# Patient Record
Sex: FEMALE | Race: WHITE | HISPANIC OR LATINO | URBAN - METROPOLITAN AREA
[De-identification: names, ages, dates, MRNs, and addresses within clinical notes are randomized per-mention and may not be internally consistent; named-entity substitution may affect disease eponyms.]

---

## 2017-12-17 ENCOUNTER — EMERGENCY (EMERGENCY)
Facility: HOSPITAL | Age: 27
LOS: 0 days | Discharge: HOME | End: 2017-12-17

## 2017-12-17 DIAGNOSIS — R10.12 LEFT UPPER QUADRANT PAIN: ICD-10-CM

## 2017-12-17 DIAGNOSIS — R11.2 NAUSEA WITH VOMITING, UNSPECIFIED: ICD-10-CM

## 2017-12-17 DIAGNOSIS — R51 HEADACHE: ICD-10-CM

## 2017-12-17 DIAGNOSIS — R19.7 DIARRHEA, UNSPECIFIED: ICD-10-CM

## 2017-12-17 DIAGNOSIS — F32.9 MAJOR DEPRESSIVE DISORDER, SINGLE EPISODE, UNSPECIFIED: ICD-10-CM

## 2017-12-22 ENCOUNTER — OUTPATIENT (OUTPATIENT)
Dept: OUTPATIENT SERVICES | Facility: HOSPITAL | Age: 27
LOS: 1 days | Discharge: HOME | End: 2017-12-22

## 2017-12-22 DIAGNOSIS — F33.0 MAJOR DEPRESSIVE DISORDER, RECURRENT, MILD: ICD-10-CM

## 2018-01-31 ENCOUNTER — OUTPATIENT (OUTPATIENT)
Dept: OUTPATIENT SERVICES | Facility: HOSPITAL | Age: 28
LOS: 1 days | Discharge: HOME | End: 2018-01-31

## 2018-01-31 DIAGNOSIS — F33.0 MAJOR DEPRESSIVE DISORDER, RECURRENT, MILD: ICD-10-CM

## 2018-02-28 ENCOUNTER — OUTPATIENT (OUTPATIENT)
Dept: OUTPATIENT SERVICES | Facility: HOSPITAL | Age: 28
LOS: 1 days | Discharge: HOME | End: 2018-02-28

## 2018-02-28 DIAGNOSIS — F33.0 MAJOR DEPRESSIVE DISORDER, RECURRENT, MILD: ICD-10-CM

## 2018-03-16 ENCOUNTER — OUTPATIENT (OUTPATIENT)
Dept: OUTPATIENT SERVICES | Facility: HOSPITAL | Age: 28
LOS: 1 days | Discharge: HOME | End: 2018-03-16

## 2018-03-16 DIAGNOSIS — F41.0 PANIC DISORDER [EPISODIC PAROXYSMAL ANXIETY]: ICD-10-CM

## 2018-03-23 ENCOUNTER — OUTPATIENT (OUTPATIENT)
Dept: OUTPATIENT SERVICES | Facility: HOSPITAL | Age: 28
LOS: 1 days | Discharge: HOME | End: 2018-03-23

## 2018-03-23 DIAGNOSIS — Z00.00 ENCOUNTER FOR GENERAL ADULT MEDICAL EXAMINATION WITHOUT ABNORMAL FINDINGS: ICD-10-CM

## 2018-03-28 ENCOUNTER — OUTPATIENT (OUTPATIENT)
Dept: OUTPATIENT SERVICES | Facility: HOSPITAL | Age: 28
LOS: 1 days | Discharge: HOME | End: 2018-03-28

## 2018-03-28 DIAGNOSIS — F33.0 MAJOR DEPRESSIVE DISORDER, RECURRENT, MILD: ICD-10-CM

## 2018-04-25 ENCOUNTER — OUTPATIENT (OUTPATIENT)
Dept: OUTPATIENT SERVICES | Facility: HOSPITAL | Age: 28
LOS: 1 days | Discharge: HOME | End: 2018-04-25

## 2018-04-25 DIAGNOSIS — F33.0 MAJOR DEPRESSIVE DISORDER, RECURRENT, MILD: ICD-10-CM

## 2018-05-23 ENCOUNTER — OUTPATIENT (OUTPATIENT)
Dept: OUTPATIENT SERVICES | Facility: HOSPITAL | Age: 28
LOS: 1 days | Discharge: HOME | End: 2018-05-23

## 2018-05-23 DIAGNOSIS — F33.0 MAJOR DEPRESSIVE DISORDER, RECURRENT, MILD: ICD-10-CM

## 2018-06-29 ENCOUNTER — OUTPATIENT (OUTPATIENT)
Dept: OUTPATIENT SERVICES | Facility: HOSPITAL | Age: 28
LOS: 1 days | Discharge: HOME | End: 2018-06-29

## 2018-06-29 DIAGNOSIS — F33.0 MAJOR DEPRESSIVE DISORDER, RECURRENT, MILD: ICD-10-CM

## 2018-07-18 ENCOUNTER — OUTPATIENT (OUTPATIENT)
Dept: OUTPATIENT SERVICES | Facility: HOSPITAL | Age: 28
LOS: 1 days | Discharge: HOME | End: 2018-07-18

## 2018-07-18 DIAGNOSIS — F33.0 MAJOR DEPRESSIVE DISORDER, RECURRENT, MILD: ICD-10-CM

## 2018-08-20 ENCOUNTER — TRANSCRIPTION ENCOUNTER (OUTPATIENT)
Age: 28
End: 2018-08-20

## 2018-08-30 ENCOUNTER — OUTPATIENT (OUTPATIENT)
Dept: OUTPATIENT SERVICES | Facility: HOSPITAL | Age: 28
LOS: 1 days | Discharge: HOME | End: 2018-08-30

## 2018-08-30 DIAGNOSIS — F33.0 MAJOR DEPRESSIVE DISORDER, RECURRENT, MILD: ICD-10-CM

## 2018-09-19 ENCOUNTER — OUTPATIENT (OUTPATIENT)
Dept: OUTPATIENT SERVICES | Facility: HOSPITAL | Age: 28
LOS: 1 days | Discharge: HOME | End: 2018-09-19

## 2018-09-19 DIAGNOSIS — R23.2 FLUSHING: ICD-10-CM

## 2018-09-19 DIAGNOSIS — F41.0 PANIC DISORDER [EPISODIC PAROXYSMAL ANXIETY]: ICD-10-CM

## 2018-10-17 ENCOUNTER — OUTPATIENT (OUTPATIENT)
Dept: OUTPATIENT SERVICES | Facility: HOSPITAL | Age: 28
LOS: 1 days | Discharge: HOME | End: 2018-10-17

## 2018-10-17 DIAGNOSIS — R23.2 FLUSHING: ICD-10-CM

## 2018-10-17 DIAGNOSIS — F41.0 PANIC DISORDER [EPISODIC PAROXYSMAL ANXIETY]: ICD-10-CM

## 2018-11-28 ENCOUNTER — OUTPATIENT (OUTPATIENT)
Dept: OUTPATIENT SERVICES | Facility: HOSPITAL | Age: 28
LOS: 1 days | Discharge: HOME | End: 2018-11-28

## 2018-11-28 DIAGNOSIS — F41.0 PANIC DISORDER [EPISODIC PAROXYSMAL ANXIETY]: ICD-10-CM

## 2018-11-28 DIAGNOSIS — R23.2 FLUSHING: ICD-10-CM

## 2018-12-12 ENCOUNTER — OUTPATIENT (OUTPATIENT)
Dept: OUTPATIENT SERVICES | Facility: HOSPITAL | Age: 28
LOS: 1 days | Discharge: HOME | End: 2018-12-12

## 2018-12-12 DIAGNOSIS — F41.0 PANIC DISORDER [EPISODIC PAROXYSMAL ANXIETY]: ICD-10-CM

## 2018-12-12 DIAGNOSIS — R23.2 FLUSHING: ICD-10-CM

## 2019-01-09 ENCOUNTER — OUTPATIENT (OUTPATIENT)
Dept: OUTPATIENT SERVICES | Facility: HOSPITAL | Age: 29
LOS: 1 days | Discharge: HOME | End: 2019-01-09

## 2019-01-09 DIAGNOSIS — F41.0 PANIC DISORDER [EPISODIC PAROXYSMAL ANXIETY]: ICD-10-CM

## 2019-01-09 DIAGNOSIS — R23.2 FLUSHING: ICD-10-CM

## 2019-02-06 ENCOUNTER — OUTPATIENT (OUTPATIENT)
Dept: OUTPATIENT SERVICES | Facility: HOSPITAL | Age: 29
LOS: 1 days | Discharge: HOME | End: 2019-02-06

## 2019-02-06 DIAGNOSIS — F41.0 PANIC DISORDER [EPISODIC PAROXYSMAL ANXIETY]: ICD-10-CM

## 2019-02-27 ENCOUNTER — OUTPATIENT (OUTPATIENT)
Dept: OUTPATIENT SERVICES | Facility: HOSPITAL | Age: 29
LOS: 1 days | Discharge: HOME | End: 2019-02-27

## 2019-02-27 DIAGNOSIS — F41.0 PANIC DISORDER [EPISODIC PAROXYSMAL ANXIETY]: ICD-10-CM

## 2019-05-08 ENCOUNTER — OUTPATIENT (OUTPATIENT)
Dept: OUTPATIENT SERVICES | Facility: HOSPITAL | Age: 29
LOS: 1 days | Discharge: HOME | End: 2019-05-08

## 2019-05-09 DIAGNOSIS — F41.0 PANIC DISORDER [EPISODIC PAROXYSMAL ANXIETY]: ICD-10-CM

## 2019-08-28 ENCOUNTER — OUTPATIENT (OUTPATIENT)
Dept: OUTPATIENT SERVICES | Facility: HOSPITAL | Age: 29
LOS: 1 days | Discharge: HOME | End: 2019-08-28
Payer: COMMERCIAL

## 2019-08-28 DIAGNOSIS — F41.0 PANIC DISORDER [EPISODIC PAROXYSMAL ANXIETY]: ICD-10-CM

## 2019-08-28 PROCEDURE — 99213 OFFICE O/P EST LOW 20 MIN: CPT | Mod: GC

## 2019-10-03 ENCOUNTER — OUTPATIENT (OUTPATIENT)
Dept: OUTPATIENT SERVICES | Facility: HOSPITAL | Age: 29
LOS: 1 days | Discharge: HOME | End: 2019-10-03

## 2019-10-03 DIAGNOSIS — F41.0 PANIC DISORDER [EPISODIC PAROXYSMAL ANXIETY]: ICD-10-CM

## 2019-10-17 ENCOUNTER — OUTPATIENT (OUTPATIENT)
Dept: OUTPATIENT SERVICES | Facility: HOSPITAL | Age: 29
LOS: 1 days | Discharge: HOME | End: 2019-10-17

## 2019-10-17 DIAGNOSIS — F41.0 PANIC DISORDER [EPISODIC PAROXYSMAL ANXIETY]: ICD-10-CM

## 2019-11-25 ENCOUNTER — OUTPATIENT (OUTPATIENT)
Dept: OUTPATIENT SERVICES | Facility: HOSPITAL | Age: 29
LOS: 1 days | Discharge: HOME | End: 2019-11-25

## 2019-11-25 DIAGNOSIS — F41.0 PANIC DISORDER [EPISODIC PAROXYSMAL ANXIETY]: ICD-10-CM

## 2019-12-09 ENCOUNTER — OUTPATIENT (OUTPATIENT)
Dept: OUTPATIENT SERVICES | Facility: HOSPITAL | Age: 29
LOS: 1 days | Discharge: HOME | End: 2019-12-09

## 2019-12-09 DIAGNOSIS — F41.0 PANIC DISORDER [EPISODIC PAROXYSMAL ANXIETY]: ICD-10-CM

## 2019-12-19 ENCOUNTER — OUTPATIENT (OUTPATIENT)
Dept: OUTPATIENT SERVICES | Facility: HOSPITAL | Age: 29
LOS: 1 days | Discharge: HOME | End: 2019-12-19
Payer: COMMERCIAL

## 2019-12-19 DIAGNOSIS — F41.0 PANIC DISORDER [EPISODIC PAROXYSMAL ANXIETY]: ICD-10-CM

## 2019-12-19 PROCEDURE — 99214 OFFICE O/P EST MOD 30 MIN: CPT

## 2020-01-30 ENCOUNTER — OUTPATIENT (OUTPATIENT)
Dept: OUTPATIENT SERVICES | Facility: HOSPITAL | Age: 30
LOS: 1 days | Discharge: HOME | End: 2020-01-30

## 2020-01-30 DIAGNOSIS — F41.0 PANIC DISORDER [EPISODIC PAROXYSMAL ANXIETY]: ICD-10-CM

## 2020-02-06 ENCOUNTER — OUTPATIENT (OUTPATIENT)
Dept: OUTPATIENT SERVICES | Facility: HOSPITAL | Age: 30
LOS: 1 days | Discharge: HOME | End: 2020-02-06

## 2020-02-06 DIAGNOSIS — F41.0 PANIC DISORDER [EPISODIC PAROXYSMAL ANXIETY]: ICD-10-CM

## 2020-02-14 ENCOUNTER — OUTPATIENT (OUTPATIENT)
Dept: OUTPATIENT SERVICES | Facility: HOSPITAL | Age: 30
LOS: 1 days | Discharge: HOME | End: 2020-02-14

## 2020-02-14 DIAGNOSIS — F41.0 PANIC DISORDER [EPISODIC PAROXYSMAL ANXIETY]: ICD-10-CM

## 2020-03-18 ENCOUNTER — OUTPATIENT (OUTPATIENT)
Dept: OUTPATIENT SERVICES | Facility: HOSPITAL | Age: 30
LOS: 1 days | Discharge: HOME | End: 2020-03-18

## 2020-03-18 DIAGNOSIS — F41.0 PANIC DISORDER [EPISODIC PAROXYSMAL ANXIETY]: ICD-10-CM

## 2020-03-30 ENCOUNTER — OUTPATIENT (OUTPATIENT)
Dept: OUTPATIENT SERVICES | Facility: HOSPITAL | Age: 30
LOS: 1 days | Discharge: HOME | End: 2020-03-30

## 2020-03-30 DIAGNOSIS — F41.0 PANIC DISORDER [EPISODIC PAROXYSMAL ANXIETY]: ICD-10-CM

## 2020-04-14 ENCOUNTER — OUTPATIENT (OUTPATIENT)
Dept: OUTPATIENT SERVICES | Facility: HOSPITAL | Age: 30
LOS: 1 days | Discharge: HOME | End: 2020-04-14

## 2020-04-14 DIAGNOSIS — F41.0 PANIC DISORDER [EPISODIC PAROXYSMAL ANXIETY]: ICD-10-CM

## 2020-05-14 ENCOUNTER — OUTPATIENT (OUTPATIENT)
Dept: OUTPATIENT SERVICES | Facility: HOSPITAL | Age: 30
LOS: 1 days | Discharge: HOME | End: 2020-05-14
Payer: COMMERCIAL

## 2020-05-14 DIAGNOSIS — F41.0 PANIC DISORDER [EPISODIC PAROXYSMAL ANXIETY]: ICD-10-CM

## 2020-05-14 PROCEDURE — 99213 OFFICE O/P EST LOW 20 MIN: CPT | Mod: 95,GC

## 2020-05-28 ENCOUNTER — OUTPATIENT (OUTPATIENT)
Dept: OUTPATIENT SERVICES | Facility: HOSPITAL | Age: 30
LOS: 1 days | Discharge: HOME | End: 2020-05-28

## 2020-05-28 DIAGNOSIS — F41.0 PANIC DISORDER [EPISODIC PAROXYSMAL ANXIETY]: ICD-10-CM

## 2020-06-11 ENCOUNTER — OUTPATIENT (OUTPATIENT)
Dept: OUTPATIENT SERVICES | Facility: HOSPITAL | Age: 30
LOS: 1 days | Discharge: HOME | End: 2020-06-11

## 2020-06-11 DIAGNOSIS — F41.0 PANIC DISORDER [EPISODIC PAROXYSMAL ANXIETY]: ICD-10-CM

## 2020-06-18 ENCOUNTER — OUTPATIENT (OUTPATIENT)
Dept: OUTPATIENT SERVICES | Facility: HOSPITAL | Age: 30
LOS: 1 days | Discharge: HOME | End: 2020-06-18
Payer: COMMERCIAL

## 2020-06-18 DIAGNOSIS — F41.0 PANIC DISORDER [EPISODIC PAROXYSMAL ANXIETY]: ICD-10-CM

## 2020-06-18 PROCEDURE — 99214 OFFICE O/P EST MOD 30 MIN: CPT | Mod: GC,95

## 2020-06-25 ENCOUNTER — OUTPATIENT (OUTPATIENT)
Dept: OUTPATIENT SERVICES | Facility: HOSPITAL | Age: 30
LOS: 1 days | Discharge: HOME | End: 2020-06-25

## 2020-06-25 DIAGNOSIS — F41.0 PANIC DISORDER [EPISODIC PAROXYSMAL ANXIETY]: ICD-10-CM

## 2020-07-09 ENCOUNTER — OUTPATIENT (OUTPATIENT)
Dept: OUTPATIENT SERVICES | Facility: HOSPITAL | Age: 30
LOS: 1 days | Discharge: HOME | End: 2020-07-09

## 2020-07-09 DIAGNOSIS — F41.0 PANIC DISORDER [EPISODIC PAROXYSMAL ANXIETY]: ICD-10-CM

## 2020-07-23 ENCOUNTER — OUTPATIENT (OUTPATIENT)
Dept: OUTPATIENT SERVICES | Facility: HOSPITAL | Age: 30
LOS: 1 days | Discharge: HOME | End: 2020-07-23
Payer: COMMERCIAL

## 2020-07-23 DIAGNOSIS — F41.0 PANIC DISORDER [EPISODIC PAROXYSMAL ANXIETY]: ICD-10-CM

## 2020-07-23 PROCEDURE — 99214 OFFICE O/P EST MOD 30 MIN: CPT

## 2020-08-28 ENCOUNTER — OUTPATIENT (OUTPATIENT)
Dept: OUTPATIENT SERVICES | Facility: HOSPITAL | Age: 30
LOS: 1 days | Discharge: HOME | End: 2020-08-28

## 2020-08-28 DIAGNOSIS — F41.0 PANIC DISORDER [EPISODIC PAROXYSMAL ANXIETY]: ICD-10-CM

## 2020-09-03 ENCOUNTER — OUTPATIENT (OUTPATIENT)
Dept: OUTPATIENT SERVICES | Facility: HOSPITAL | Age: 30
LOS: 1 days | Discharge: HOME | End: 2020-09-03
Payer: MEDICARE

## 2020-09-03 DIAGNOSIS — F41.0 PANIC DISORDER [EPISODIC PAROXYSMAL ANXIETY]: ICD-10-CM

## 2020-09-03 DIAGNOSIS — F41.1 GENERALIZED ANXIETY DISORDER: ICD-10-CM

## 2020-09-03 PROCEDURE — 99214 OFFICE O/P EST MOD 30 MIN: CPT | Mod: 95,GC

## 2020-09-18 ENCOUNTER — OUTPATIENT (OUTPATIENT)
Dept: OUTPATIENT SERVICES | Facility: HOSPITAL | Age: 30
LOS: 1 days | Discharge: HOME | End: 2020-09-18

## 2020-09-18 DIAGNOSIS — F41.0 PANIC DISORDER [EPISODIC PAROXYSMAL ANXIETY]: ICD-10-CM

## 2020-09-18 DIAGNOSIS — F41.1 GENERALIZED ANXIETY DISORDER: ICD-10-CM

## 2020-12-31 ENCOUNTER — OUTPATIENT (OUTPATIENT)
Dept: OUTPATIENT SERVICES | Facility: HOSPITAL | Age: 30
LOS: 1 days | Discharge: HOME | End: 2020-12-31

## 2020-12-31 ENCOUNTER — APPOINTMENT (OUTPATIENT)
Dept: PSYCHIATRY | Facility: CLINIC | Age: 30
End: 2020-12-31

## 2020-12-31 DIAGNOSIS — F41.0 PANIC DISORDER [EPISODIC PAROXYSMAL ANXIETY]: ICD-10-CM

## 2020-12-31 DIAGNOSIS — F41.1 GENERALIZED ANXIETY DISORDER: ICD-10-CM

## 2021-01-07 ENCOUNTER — APPOINTMENT (OUTPATIENT)
Dept: PSYCHIATRY | Facility: CLINIC | Age: 31
End: 2021-01-07

## 2021-01-07 ENCOUNTER — OUTPATIENT (OUTPATIENT)
Dept: OUTPATIENT SERVICES | Facility: HOSPITAL | Age: 31
LOS: 1 days | Discharge: HOME | End: 2021-01-07

## 2021-01-07 DIAGNOSIS — N31.2 FLACCID NEUROPATHIC BLADDER, NOT ELSEWHERE CLASSIFIED: ICD-10-CM

## 2021-01-07 DIAGNOSIS — K21.9 GASTRO-ESOPHAGEAL REFLUX DISEASE W/OUT ESOPHAGITIS: ICD-10-CM

## 2021-01-07 DIAGNOSIS — F41.0 PANIC DISORDER [EPISODIC PAROXYSMAL ANXIETY]: ICD-10-CM

## 2021-01-07 DIAGNOSIS — F41.1 GENERALIZED ANXIETY DISORDER: ICD-10-CM

## 2021-01-21 ENCOUNTER — OUTPATIENT (OUTPATIENT)
Dept: OUTPATIENT SERVICES | Facility: HOSPITAL | Age: 31
LOS: 1 days | Discharge: HOME | End: 2021-01-21

## 2021-01-21 ENCOUNTER — APPOINTMENT (OUTPATIENT)
Dept: PSYCHIATRY | Facility: CLINIC | Age: 31
End: 2021-01-21

## 2021-01-21 DIAGNOSIS — F41.0 PANIC DISORDER [EPISODIC PAROXYSMAL ANXIETY]: ICD-10-CM

## 2021-01-21 DIAGNOSIS — F41.1 GENERALIZED ANXIETY DISORDER: ICD-10-CM

## 2021-02-04 ENCOUNTER — APPOINTMENT (OUTPATIENT)
Dept: PSYCHIATRY | Facility: CLINIC | Age: 31
End: 2021-02-04

## 2021-02-11 ENCOUNTER — APPOINTMENT (OUTPATIENT)
Dept: PSYCHIATRY | Facility: CLINIC | Age: 31
End: 2021-02-11

## 2021-02-11 ENCOUNTER — OUTPATIENT (OUTPATIENT)
Dept: OUTPATIENT SERVICES | Facility: HOSPITAL | Age: 31
LOS: 1 days | Discharge: HOME | End: 2021-02-11

## 2021-02-11 DIAGNOSIS — F41.1 GENERALIZED ANXIETY DISORDER: ICD-10-CM

## 2021-02-11 DIAGNOSIS — F41.0 PANIC DISORDER [EPISODIC PAROXYSMAL ANXIETY]: ICD-10-CM

## 2021-02-25 ENCOUNTER — APPOINTMENT (OUTPATIENT)
Dept: PSYCHIATRY | Facility: CLINIC | Age: 31
End: 2021-02-25

## 2021-03-04 ENCOUNTER — APPOINTMENT (OUTPATIENT)
Dept: PSYCHIATRY | Facility: CLINIC | Age: 31
End: 2021-03-04

## 2021-03-12 ENCOUNTER — APPOINTMENT (OUTPATIENT)
Dept: PSYCHIATRY | Facility: CLINIC | Age: 31
End: 2021-03-12

## 2021-03-12 ENCOUNTER — OUTPATIENT (OUTPATIENT)
Dept: OUTPATIENT SERVICES | Facility: HOSPITAL | Age: 31
LOS: 1 days | Discharge: HOME | End: 2021-03-12

## 2021-03-12 DIAGNOSIS — F41.1 GENERALIZED ANXIETY DISORDER: ICD-10-CM

## 2021-03-12 DIAGNOSIS — F41.0 PANIC DISORDER [EPISODIC PAROXYSMAL ANXIETY]: ICD-10-CM

## 2021-03-25 ENCOUNTER — OUTPATIENT (OUTPATIENT)
Dept: OUTPATIENT SERVICES | Facility: HOSPITAL | Age: 31
LOS: 1 days | Discharge: HOME | End: 2021-03-25

## 2021-03-25 ENCOUNTER — APPOINTMENT (OUTPATIENT)
Dept: PSYCHIATRY | Facility: CLINIC | Age: 31
End: 2021-03-25

## 2021-03-25 DIAGNOSIS — F41.1 GENERALIZED ANXIETY DISORDER: ICD-10-CM

## 2021-03-25 DIAGNOSIS — F41.0 PANIC DISORDER [EPISODIC PAROXYSMAL ANXIETY]: ICD-10-CM

## 2021-04-15 ENCOUNTER — APPOINTMENT (OUTPATIENT)
Dept: PSYCHIATRY | Facility: CLINIC | Age: 31
End: 2021-04-15

## 2021-04-22 ENCOUNTER — APPOINTMENT (OUTPATIENT)
Dept: PSYCHIATRY | Facility: CLINIC | Age: 31
End: 2021-04-22

## 2021-04-29 ENCOUNTER — OUTPATIENT (OUTPATIENT)
Dept: OUTPATIENT SERVICES | Facility: HOSPITAL | Age: 31
LOS: 1 days | Discharge: HOME | End: 2021-04-29

## 2021-04-29 ENCOUNTER — APPOINTMENT (OUTPATIENT)
Dept: PSYCHIATRY | Facility: CLINIC | Age: 31
End: 2021-04-29

## 2021-04-29 DIAGNOSIS — F41.1 GENERALIZED ANXIETY DISORDER: ICD-10-CM

## 2021-04-29 DIAGNOSIS — F41.0 PANIC DISORDER [EPISODIC PAROXYSMAL ANXIETY]: ICD-10-CM

## 2021-05-27 ENCOUNTER — APPOINTMENT (OUTPATIENT)
Dept: PSYCHIATRY | Facility: CLINIC | Age: 31
End: 2021-05-27

## 2021-06-24 ENCOUNTER — APPOINTMENT (OUTPATIENT)
Dept: PSYCHIATRY | Facility: CLINIC | Age: 31
End: 2021-06-24

## 2021-07-08 ENCOUNTER — OUTPATIENT (OUTPATIENT)
Dept: OUTPATIENT SERVICES | Facility: HOSPITAL | Age: 31
LOS: 1 days | Discharge: HOME | End: 2021-07-08

## 2021-07-08 ENCOUNTER — APPOINTMENT (OUTPATIENT)
Dept: PSYCHIATRY | Facility: CLINIC | Age: 31
End: 2021-07-08

## 2021-07-08 DIAGNOSIS — F41.0 PANIC DISORDER [EPISODIC PAROXYSMAL ANXIETY]: ICD-10-CM

## 2021-07-08 DIAGNOSIS — F41.1 GENERALIZED ANXIETY DISORDER: ICD-10-CM

## 2021-07-23 ENCOUNTER — OUTPATIENT (OUTPATIENT)
Dept: OUTPATIENT SERVICES | Facility: HOSPITAL | Age: 31
LOS: 1 days | Discharge: HOME | End: 2021-07-23

## 2021-07-23 ENCOUNTER — APPOINTMENT (OUTPATIENT)
Dept: PSYCHIATRY | Facility: CLINIC | Age: 31
End: 2021-07-23

## 2021-07-23 DIAGNOSIS — F52.9 UNSPECIFIED SEXUAL DYSFUNCTION NOT DUE TO A SUBSTANCE OR KNOWN PHYSIOLOGICAL CONDITION: ICD-10-CM

## 2021-07-23 DIAGNOSIS — F41.1 GENERALIZED ANXIETY DISORDER: ICD-10-CM

## 2021-07-23 DIAGNOSIS — F41.0 PANIC DISORDER [EPISODIC PAROXYSMAL ANXIETY]: ICD-10-CM

## 2021-07-23 RX ORDER — BUPROPION HYDROCHLORIDE 450 MG/1
450 TABLET, FILM COATED, EXTENDED RELEASE ORAL EVERY MORNING
Qty: 21 | Refills: 0 | Status: DISCONTINUED | COMMUNITY
Start: 2021-01-07 | End: 2021-07-23

## 2021-07-23 RX ORDER — TRAZODONE HYDROCHLORIDE 50 MG/1
50 TABLET ORAL
Qty: 60 | Refills: 0 | Status: DISCONTINUED | COMMUNITY
Start: 2021-01-07 | End: 2021-07-23

## 2021-07-23 RX ORDER — PROPRANOLOL HYDROCHLORIDE 10 MG/1
10 TABLET ORAL
Qty: 60 | Refills: 0 | Status: DISCONTINUED | COMMUNITY
Start: 2021-01-07 | End: 2021-07-23

## 2021-07-29 ENCOUNTER — OUTPATIENT (OUTPATIENT)
Dept: OUTPATIENT SERVICES | Facility: HOSPITAL | Age: 31
LOS: 1 days | Discharge: HOME | End: 2021-07-29

## 2021-07-29 ENCOUNTER — APPOINTMENT (OUTPATIENT)
Dept: PSYCHIATRY | Facility: CLINIC | Age: 31
End: 2021-07-29

## 2021-07-29 DIAGNOSIS — F41.0 PANIC DISORDER [EPISODIC PAROXYSMAL ANXIETY]: ICD-10-CM

## 2021-07-29 DIAGNOSIS — F52.9 UNSPECIFIED SEXUAL DYSFUNCTION NOT DUE TO A SUBSTANCE OR KNOWN PHYSIOLOGICAL CONDITION: ICD-10-CM

## 2021-07-29 DIAGNOSIS — F41.1 GENERALIZED ANXIETY DISORDER: ICD-10-CM

## 2021-08-13 ENCOUNTER — OUTPATIENT (OUTPATIENT)
Dept: OUTPATIENT SERVICES | Facility: HOSPITAL | Age: 31
LOS: 1 days | Discharge: HOME | End: 2021-08-13

## 2021-08-13 ENCOUNTER — APPOINTMENT (OUTPATIENT)
Dept: PSYCHIATRY | Facility: CLINIC | Age: 31
End: 2021-08-13

## 2021-08-13 DIAGNOSIS — F41.0 PANIC DISORDER [EPISODIC PAROXYSMAL ANXIETY]: ICD-10-CM

## 2021-08-13 DIAGNOSIS — F52.9 UNSPECIFIED SEXUAL DYSFUNCTION NOT DUE TO A SUBSTANCE OR KNOWN PHYSIOLOGICAL CONDITION: ICD-10-CM

## 2021-08-13 DIAGNOSIS — F41.1 GENERALIZED ANXIETY DISORDER: ICD-10-CM

## 2021-08-13 RX ORDER — BUPROPION HYDROCHLORIDE 450 MG/1
450 TABLET, FILM COATED, EXTENDED RELEASE ORAL
Qty: 21 | Refills: 0 | Status: DISCONTINUED | COMMUNITY
Start: 2021-07-23 | End: 2021-08-13

## 2021-08-17 ENCOUNTER — APPOINTMENT (OUTPATIENT)
Dept: PSYCHIATRY | Facility: CLINIC | Age: 31
End: 2021-08-17

## 2021-08-17 ENCOUNTER — OUTPATIENT (OUTPATIENT)
Dept: OUTPATIENT SERVICES | Facility: HOSPITAL | Age: 31
LOS: 1 days | Discharge: HOME | End: 2021-08-17

## 2021-08-17 DIAGNOSIS — F52.9 UNSPECIFIED SEXUAL DYSFUNCTION NOT DUE TO A SUBSTANCE OR KNOWN PHYSIOLOGICAL CONDITION: ICD-10-CM

## 2021-08-17 DIAGNOSIS — F41.0 PANIC DISORDER [EPISODIC PAROXYSMAL ANXIETY]: ICD-10-CM

## 2021-08-17 DIAGNOSIS — F41.1 GENERALIZED ANXIETY DISORDER: ICD-10-CM

## 2021-09-07 ENCOUNTER — NON-APPOINTMENT (OUTPATIENT)
Age: 31
End: 2021-09-07

## 2021-09-15 ENCOUNTER — OUTPATIENT (OUTPATIENT)
Dept: OUTPATIENT SERVICES | Facility: HOSPITAL | Age: 31
LOS: 1 days | Discharge: HOME | End: 2021-09-15

## 2021-09-15 ENCOUNTER — APPOINTMENT (OUTPATIENT)
Dept: PSYCHIATRY | Facility: CLINIC | Age: 31
End: 2021-09-15

## 2021-09-15 DIAGNOSIS — F52.9 UNSPECIFIED SEXUAL DYSFUNCTION NOT DUE TO A SUBSTANCE OR KNOWN PHYSIOLOGICAL CONDITION: ICD-10-CM

## 2021-09-15 DIAGNOSIS — F41.0 PANIC DISORDER [EPISODIC PAROXYSMAL ANXIETY]: ICD-10-CM

## 2021-09-15 DIAGNOSIS — F41.1 GENERALIZED ANXIETY DISORDER: ICD-10-CM

## 2021-09-17 ENCOUNTER — APPOINTMENT (OUTPATIENT)
Dept: PSYCHIATRY | Facility: CLINIC | Age: 31
End: 2021-09-17

## 2021-09-27 ENCOUNTER — APPOINTMENT (OUTPATIENT)
Dept: PSYCHIATRY | Facility: CLINIC | Age: 31
End: 2021-09-27

## 2021-09-27 ENCOUNTER — OUTPATIENT (OUTPATIENT)
Dept: OUTPATIENT SERVICES | Facility: HOSPITAL | Age: 31
LOS: 1 days | Discharge: HOME | End: 2021-09-27

## 2021-09-27 DIAGNOSIS — F41.0 PANIC DISORDER [EPISODIC PAROXYSMAL ANXIETY]: ICD-10-CM

## 2021-09-27 DIAGNOSIS — F41.1 GENERALIZED ANXIETY DISORDER: ICD-10-CM

## 2021-09-27 DIAGNOSIS — F41.8 OTHER SPECIFIED ANXIETY DISORDERS: ICD-10-CM

## 2021-10-07 ENCOUNTER — APPOINTMENT (OUTPATIENT)
Dept: PSYCHIATRY | Facility: CLINIC | Age: 31
End: 2021-10-07

## 2021-11-05 ENCOUNTER — APPOINTMENT (OUTPATIENT)
Dept: PSYCHIATRY | Facility: CLINIC | Age: 31
End: 2021-11-05

## 2021-11-05 ENCOUNTER — OUTPATIENT (OUTPATIENT)
Dept: OUTPATIENT SERVICES | Facility: HOSPITAL | Age: 31
LOS: 1 days | Discharge: HOME | End: 2021-11-05

## 2021-11-05 DIAGNOSIS — F41.1 GENERALIZED ANXIETY DISORDER: ICD-10-CM

## 2021-11-05 DIAGNOSIS — F41.8 OTHER SPECIFIED ANXIETY DISORDERS: ICD-10-CM

## 2021-11-05 DIAGNOSIS — F41.0 PANIC DISORDER [EPISODIC PAROXYSMAL ANXIETY]: ICD-10-CM

## 2021-12-27 ENCOUNTER — APPOINTMENT (OUTPATIENT)
Dept: PSYCHIATRY | Facility: CLINIC | Age: 31
End: 2021-12-27

## 2022-01-10 ENCOUNTER — OUTPATIENT (OUTPATIENT)
Dept: OUTPATIENT SERVICES | Facility: HOSPITAL | Age: 32
LOS: 1 days | Discharge: HOME | End: 2022-01-10

## 2022-01-10 ENCOUNTER — APPOINTMENT (OUTPATIENT)
Dept: PSYCHIATRY | Facility: CLINIC | Age: 32
End: 2022-01-10

## 2022-01-10 DIAGNOSIS — F41.8 OTHER SPECIFIED ANXIETY DISORDERS: ICD-10-CM

## 2022-01-10 DIAGNOSIS — F41.0 PANIC DISORDER [EPISODIC PAROXYSMAL ANXIETY]: ICD-10-CM

## 2022-01-10 DIAGNOSIS — F41.1 GENERALIZED ANXIETY DISORDER: ICD-10-CM

## 2022-01-10 DIAGNOSIS — G47.00 INSOMNIA, UNSPECIFIED: ICD-10-CM

## 2022-02-20 RX ORDER — FLUOXETINE HYDROCHLORIDE 40 MG/1
40 CAPSULE ORAL DAILY
Qty: 60 | Refills: 0 | Status: DISCONTINUED | COMMUNITY
Start: 2021-09-27 | End: 2022-02-20

## 2022-02-28 ENCOUNTER — APPOINTMENT (OUTPATIENT)
Dept: PSYCHIATRY | Facility: CLINIC | Age: 32
End: 2022-02-28

## 2022-03-14 ENCOUNTER — APPOINTMENT (OUTPATIENT)
Dept: PSYCHIATRY | Facility: CLINIC | Age: 32
End: 2022-03-14

## 2022-03-21 ENCOUNTER — APPOINTMENT (OUTPATIENT)
Dept: PSYCHIATRY | Facility: CLINIC | Age: 32
End: 2022-03-21

## 2022-03-21 ENCOUNTER — OUTPATIENT (OUTPATIENT)
Dept: OUTPATIENT SERVICES | Facility: HOSPITAL | Age: 32
LOS: 1 days | Discharge: HOME | End: 2022-03-21

## 2022-03-21 DIAGNOSIS — G47.00 INSOMNIA, UNSPECIFIED: ICD-10-CM

## 2022-03-21 DIAGNOSIS — F41.0 PANIC DISORDER [EPISODIC PAROXYSMAL ANXIETY]: ICD-10-CM

## 2022-03-21 DIAGNOSIS — F41.8 OTHER SPECIFIED ANXIETY DISORDERS: ICD-10-CM

## 2022-03-21 DIAGNOSIS — F41.1 GENERALIZED ANXIETY DISORDER: ICD-10-CM

## 2022-03-21 DIAGNOSIS — F52.9 UNSPECIFIED SEXUAL DYSFUNCTION NOT DUE TO A SUBSTANCE OR KNOWN PHYSIOLOGICAL CONDITION: ICD-10-CM

## 2022-05-11 ENCOUNTER — NON-APPOINTMENT (OUTPATIENT)
Age: 32
End: 2022-05-11

## 2022-05-16 ENCOUNTER — APPOINTMENT (OUTPATIENT)
Dept: PSYCHIATRY | Facility: CLINIC | Age: 32
End: 2022-05-16

## 2022-06-01 ENCOUNTER — NON-APPOINTMENT (OUTPATIENT)
Age: 32
End: 2022-06-01

## 2022-06-13 ENCOUNTER — APPOINTMENT (OUTPATIENT)
Dept: PSYCHIATRY | Facility: CLINIC | Age: 32
End: 2022-06-13

## 2022-06-13 ENCOUNTER — OUTPATIENT (OUTPATIENT)
Dept: OUTPATIENT SERVICES | Facility: HOSPITAL | Age: 32
LOS: 1 days | Discharge: HOME | End: 2022-06-13

## 2022-06-13 DIAGNOSIS — F19.981: ICD-10-CM

## 2022-06-13 DIAGNOSIS — F41.8 OTHER SPECIFIED ANXIETY DISORDERS: ICD-10-CM

## 2022-06-13 DIAGNOSIS — F41.1 GENERALIZED ANXIETY DISORDER: ICD-10-CM

## 2022-06-13 DIAGNOSIS — Z87.898 PERSONAL HISTORY OF OTHER SPECIFIED CONDITIONS: ICD-10-CM

## 2022-06-13 DIAGNOSIS — F41.0 PANIC DISORDER [EPISODIC PAROXYSMAL ANXIETY]: ICD-10-CM

## 2022-06-13 DIAGNOSIS — G47.00 INSOMNIA, UNSPECIFIED: ICD-10-CM

## 2022-06-13 DIAGNOSIS — F52.9 UNSPECIFIED SEXUAL DYSFUNCTION NOT DUE TO A SUBSTANCE OR KNOWN PHYSIOLOGICAL CONDITION: ICD-10-CM

## 2022-06-14 ENCOUNTER — APPOINTMENT (OUTPATIENT)
Dept: PSYCHIATRY | Facility: CLINIC | Age: 32
End: 2022-06-14

## 2022-06-30 PROBLEM — Z87.898 HISTORY OF SEXUAL PROBLEM: Status: RESOLVED | Noted: 2021-01-07 | Resolved: 2022-06-13

## 2022-08-01 ENCOUNTER — APPOINTMENT (OUTPATIENT)
Dept: PSYCHIATRY | Facility: CLINIC | Age: 32
End: 2022-08-01

## 2022-08-01 ENCOUNTER — OUTPATIENT (OUTPATIENT)
Dept: OUTPATIENT SERVICES | Facility: HOSPITAL | Age: 32
LOS: 1 days | Discharge: HOME | End: 2022-08-01

## 2022-08-01 DIAGNOSIS — F19.981: ICD-10-CM

## 2022-08-01 DIAGNOSIS — F41.8 OTHER SPECIFIED ANXIETY DISORDERS: ICD-10-CM

## 2022-08-01 DIAGNOSIS — G47.00 INSOMNIA, UNSPECIFIED: ICD-10-CM

## 2022-08-01 DIAGNOSIS — F41.0 PANIC DISORDER [EPISODIC PAROXYSMAL ANXIETY]: ICD-10-CM

## 2022-08-01 DIAGNOSIS — F41.1 GENERALIZED ANXIETY DISORDER: ICD-10-CM

## 2022-09-29 ENCOUNTER — APPOINTMENT (OUTPATIENT)
Dept: PSYCHIATRY | Facility: CLINIC | Age: 32
End: 2022-09-29

## 2022-09-29 ENCOUNTER — OUTPATIENT (OUTPATIENT)
Dept: OUTPATIENT SERVICES | Facility: HOSPITAL | Age: 32
LOS: 1 days | Discharge: HOME | End: 2022-09-29

## 2022-09-29 DIAGNOSIS — F41.8 OTHER SPECIFIED ANXIETY DISORDERS: ICD-10-CM

## 2022-09-29 DIAGNOSIS — F41.0 PANIC DISORDER [EPISODIC PAROXYSMAL ANXIETY]: ICD-10-CM

## 2022-09-29 DIAGNOSIS — F41.1 GENERALIZED ANXIETY DISORDER: ICD-10-CM

## 2022-11-17 ENCOUNTER — APPOINTMENT (OUTPATIENT)
Dept: PSYCHIATRY | Facility: CLINIC | Age: 32
End: 2022-11-17

## 2022-11-17 ENCOUNTER — OUTPATIENT (OUTPATIENT)
Dept: OUTPATIENT SERVICES | Facility: HOSPITAL | Age: 32
LOS: 1 days | Discharge: HOME | End: 2022-11-17

## 2022-11-17 ENCOUNTER — APPOINTMENT (OUTPATIENT)
Dept: PSYCHIATRY | Facility: CLINIC | Age: 32
End: 2022-11-17
Payer: COMMERCIAL

## 2022-11-17 DIAGNOSIS — F41.8 OTHER SPECIFIED ANXIETY DISORDERS: ICD-10-CM

## 2022-11-17 DIAGNOSIS — F41.0 PANIC DISORDER [EPISODIC PAROXYSMAL ANXIETY]: ICD-10-CM

## 2022-11-17 DIAGNOSIS — F41.1 GENERALIZED ANXIETY DISORDER: ICD-10-CM

## 2022-11-17 PROCEDURE — ZZZZZ: CPT

## 2022-11-17 NOTE — CURRENT PSYCHIATRIC SYMPTOMS
[Depressed Mood] : no depressed mood [Anhedonia] : no anhedonia [Guilt] : not feeling guilty [Decreased Concentration] : no decrease in concentrating ability [Hyperphagia] : no hyperphagia [Insomnia] : no insomnia disorder [Hypersomnia] : no ~T hypersomnia [Psychomotor Retardation] : no psychomotor retardation [Euphoria] : no euphoria [Highly Irritable] : no high irritability [Distractibility] : not distracted [Dec Need For Sleep] : no decreased need for sleep [Delusions] : no ~T delusions [Hallucination Visual] : no visual hallucinations [Hallucination Auditory] : no auditory hallucinations [Excessive Worry] : no excessive worries [Ruminations] : no rumination disorder [Restlessness] : no restlessness [Panic] : no panic disorder [Recent Onset] : no recent onset of confusion

## 2022-11-17 NOTE — ASSESSMENT
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  \par \par In interval from last follow up, patient notes stable mood. Continues to have panic attacks without increase in frequency, now describes as feeling as if she is dissociating, very negative and weak. Given stability and patient utilizing coping skills with good effect, no indication for medication change at this time and patient is agreeable to this. She remains abstinent from sedative/hypnotics and stimulants.  She notes continued marijuana use sporadically w/ no motivation to cut down further at this time.  Psychoeducation regarding mental health effects of cannabis provided.  Patient is advised of risks of taking Adderall when not Rxed, especially at high doses and in combination w/ Wellbutrin, both for mental and physical health.  Patient notes understanding. No acute safety concerns endorsed. Patient does not meet criteria for active use disorder at this time and this level of care remains appropriate at this time.\par  \par Plan:\par 1.	continue w/ Prozac 80mg PO qdaily for OPAL / Panic Disorder (consider switch to Vibryd if sexual side effects return / persist)\par 2.	Continue Wellbutrin to 300mg QD for rescue of sexual side effects and target sx of amotivation \par 3.             Adult ADHD self assessment sent to [email or web address], pt to complete and return prior to next appt\par 4.             rtc in 8 weeks or earlier, if needed.

## 2022-11-17 NOTE — PHYSICAL EXAM
[None] : none [Anxious] : anxious [Appropriate] : appropriate [Oriented] : oriented [Normal] : good [Tics] : no tics [Tremor] : ~T no ~M tremor was seen [Rigidity] : no rigidity [Dystonia] : no dystonia was noted [Feeling Restless] : not feeling ~L restless [FreeTextEntry2] : Not assessed, telehealth [FreeTextEntry3] : Not assessed, telehealth [FreeTextEntry4] : None seen on camera [Inc Latency] : no increased latency [Pressured] : not pressured [Slowed] : not slowed [Soft] : not soft [Loud] : not loud [Talkative] : not talkative [Paucity] : no paucity [Mutism] : no mutism [Dysarthria] : no dysarthria [Stuttering] : no stuttering [Perseveration] : no perseveration [Rapid] : not rapid [Delayed] : not delayed [Illogical tangential] : no logical tangential [Loose Circumstantial] : no loose circumstantial [Impoverished] : content/associations not impoverished [Impaired] : abstract reasoning not impaired [AH] : no auditory hallucinations [VH] : no visual hallucinations [Suicidal Ideation] : no suicidal ideation [Homicidal Ideation] : no homicidal ideation [Afraid] : not afraid [Irritable] : no irritable [Dysphoric] : not dysphoric [Constricted] : not constricted [Labile] : not labile [Lethargic] : no lethargic [Unarousable] : not unarousable [FreeTextEntry1] : well groomed, visualized on screen, good eye contact, private room [FreeTextEntry8] : "stable" [FreeTextEntry9] : Euthymic though somewhat anxious

## 2022-11-17 NOTE — DISCUSSION/SUMMARY
[Initial Plan] : Initial Plan [Able to manage surrounding demands and opportunities] : able to manage surrounding demands and opportunities [Able to exercise self-direction] : able to exercise self-direction [Able to set and pursue goals] : able to set and pursue goals [Adherent to treatment recommendations] : adherent to treatment recommendations [Articulate] : articulate [Cognitively intact] : cognitively intact [Insightful] : insightful [Creative] : creative [Intelligent] : intelligent [Motivated to participate in treatment] : motivated to participate in treatment [Motivated and ready for change] : motivated and ready for change [Health literacy] : health literacy [Motivated to maintain or improve physical health] : motivated to maintain or improve physical health [In good health] : in good health [Financially stable] : financially stable [Has vocational interests or hobbies] : has vocational interests or hobbies [Part of a supportive family] : part of a supportive family [Has a supportive network] : has a supportive network [Steady employment] : steady employment [Housing stability] : housing stability [High level of education] : high level of education [English fluency] : English fluency [Connected to healthcare] : connected to healthcare [Good health literacy] : good health literacy [Access to safe outdoor spaces] : access to safe outdoor spaces [Social supports] : social supports [Mental Health] : Mental Health [Initial] : Initial [every ___ weeks] : every [unfilled] weeks [FreeTextEntry1] : 11/17 [FreeTextEntry4] : I want to stop use of medications, eventually, once my anxiety and panic attacks are under better control. [de-identified] : Continue to practice mediation and identify more coping skills that work to help me manage my anxiety. [de-identified] : Continue use of medications as prescribed. [FreeTextEntry5] : Continue use of medications as prescribed and use of coping skills to self soothe.

## 2022-11-17 NOTE — FAMILY HISTORY
Addended by: Cornelio Nuñez on: 4/18/2017 04:01 PM     Modules accepted: Orders [FreeTextEntry1] : Dad – depression - irritable, anti meds. Ok Therapy.  Mom- anxiety – chronic HCV (blood transfusion after MC at age 15 yo, dx 23 liver disease) s/p harvoni.  Cirrhosis.\par Sister – 33, OCPD, environmental. \par Extended fam h/o SAs, etoh/ mdd \par

## 2022-11-17 NOTE — ASSESSMENT
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  \par \par In interval from last follow up, patient notes stable mood. Continues to have panic attacks without increase in frequency, now describes as feeling as if she is dissociating, very negative and weak. Given stability and patient utilizing coping skills with good effect, no indication for medication change at this time and patient is agreeable to this. She remains abstinent from sedative/hypnotics and stimulants.  She notes continued marijuana use sporadically w/ no motivation to cut down further at this time.  Psychoeducation regarding mental health effects of cannabis provided.  Patient is advised of risks of taking Adderall when not Rxed, especially at high doses and in combination w/ Wellbutrin, both for mental and physical health.  Patient notes understanding. No acute safety concerns endorsed. Patient does not meet criteria for active use disorder at this time and this level of care remains appropriate at this time.\par  \par Plan:\par 1.	continue w/ Prozac 80mg PO qdaily for OPAL / Panic Disorder (consider switch to Vibryd if sexual side effects return / persist)\par 2.	Continue Wellbutrin to 300mg QD for rescue of sexual side effects and target sx of amotivation \par 3.             Adult ADHD self assessment sent to [email or web address], pt to complete and return prior to next appt\par 4.             rtc in 8 weeks or earlier, if needed.

## 2022-11-17 NOTE — REASON FOR VISIT
[Patient] : Patient [Follow-Up Visit] : a follow-up visit [Home] : at home, [unfilled] , at the time of the visit. [Medical Office: ([facility])___] : at the medical office located in  [Verbal consent obtained from patient] : the patient, [unfilled] [FreeTextEntry1] : medication management

## 2022-11-17 NOTE — PAST MEDICAL HISTORY
[FreeTextEntry1] : No h/o IPP, SA/SHB\par 2016: OPD Bk for dep/anx, Wellbutrin, lexapro + vistaril \par 2015: became addicted to Adderall (thru gf)\par 2013-14: tried Gfs Wellbutrin 150-300mg\par 2011:  (during 1st Masters program in [place]) 1st time Rxed meds for panic attacks, lexparo/Xanax x 1-2 yrs. 

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
## 2022-11-17 NOTE — DISCUSSION/SUMMARY
[Initial Plan] : Initial Plan [Able to manage surrounding demands and opportunities] : able to manage surrounding demands and opportunities [Able to exercise self-direction] : able to exercise self-direction [Able to set and pursue goals] : able to set and pursue goals [Adherent to treatment recommendations] : adherent to treatment recommendations [Articulate] : articulate [Cognitively intact] : cognitively intact [Insightful] : insightful [Creative] : creative [Intelligent] : intelligent [Motivated to participate in treatment] : motivated to participate in treatment [Motivated and ready for change] : motivated and ready for change [Health literacy] : health literacy [Motivated to maintain or improve physical health] : motivated to maintain or improve physical health [In good health] : in good health [Financially stable] : financially stable [Has vocational interests or hobbies] : has vocational interests or hobbies [Part of a supportive family] : part of a supportive family [Has a supportive network] : has a supportive network [Steady employment] : steady employment [Housing stability] : housing stability [High level of education] : high level of education [English fluency] : English fluency [Connected to healthcare] : connected to healthcare [Good health literacy] : good health literacy [Access to safe outdoor spaces] : access to safe outdoor spaces [Social supports] : social supports [Mental Health] : Mental Health [Initial] : Initial [every ___ weeks] : every [unfilled] weeks [FreeTextEntry1] : 11/17 [FreeTextEntry4] : I want to stop use of medications, eventually, once my anxiety and panic attacks are under better control. [de-identified] : Continue to practice mediation and identify more coping skills that work to help me manage my anxiety. [de-identified] : Continue use of medications as prescribed. [FreeTextEntry5] : Continue use of medications as prescribed and use of coping skills to self soothe.

## 2022-11-17 NOTE — HISTORY OF PRESENT ILLNESS
[No] : no [Responsibility to family or others] : responsibility to family or others [Identifies reasons for living] : identifies reasons for living [Future oriented] : future oriented [Engaged in work or school] : engaged in work or school [Supportive social network or family] : supportive social network or family [Positive therapeutic relationships] : positive therapeutic relationships [Ability to cope with stress] : ability to cope with stress [None Known] : none known [Substance abuse] : substance abuse [Residential stability] : residential stability [Relationship stability] : relationship stability [Engagement in treatment] : engagement in treatment [Affective stability] : affective stability [Good treatment response/ compliance] : good treatment response/ compliance [de-identified] : Bri reports that she has been doing all right. 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 States that she continues to have some episodes of panic though has noticed that they have not increased in frequency or duration. Patient notices that triggers range from deadlines and stress. States that she may freeze or detach from others. Describes her partner as being very supportive; he helps her with panic attacks.\par \par Patient reports that she has moments where she feels activated yet anxious, and has some trouble focusing. She asked if this can be related to adult ADHD and asked to be screened for it. 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 \par \par Patient meditates daily and encouraged to incorporate sessions on self-compassion to reduce self criticism and judgment that often contributes to panic attacks. Patient also uses yoga, holds ice and tensing muscles to help ground herself and reduce distress. Will work to be less critical and judgemental of self.\par \par Patient has future goal of decreasing and stopping medications once stable. \par \par Patient denies current mood sxs. Denies depression (signaled by not being able to get out of bed). Denies SI, HI, NSSIB. Denies decreased need for sleep, euphoria, irritability, increase in goal directed or risky behavior and other sx of acute julisa.  [TextBox_32] : Low chronic and acute risk. Patient’s risk factors include history of substance use and psychiatric history, mitigated by engagement in job, family support, high motivation, and compliance with medications/treatment.

## 2022-11-17 NOTE — RESULTS/DATA
[FreeTextEntry1] : (3/23/18)Chol 208.  Lipid profile otherwise wnl.  CBC, CMP – wnl. TSH/FT4 – wnl. Vit D 28. \par Exams and Consultations: previous provider discussed March 2018 EKG results with PMD, RAD/ “pulmonary disease pattern,” repeat EKG performed 6/4/18, revealed normal variant with vertical axis.\par

## 2022-11-17 NOTE — HISTORY OF PRESENT ILLNESS
[No] : no [Responsibility to family or others] : responsibility to family or others [Identifies reasons for living] : identifies reasons for living [Future oriented] : future oriented [Engaged in work or school] : engaged in work or school [Supportive social network or family] : supportive social network or family [Positive therapeutic relationships] : positive therapeutic relationships [Ability to cope with stress] : ability to cope with stress [None Known] : none known [Substance abuse] : substance abuse [Residential stability] : residential stability [Relationship stability] : relationship stability [Engagement in treatment] : engagement in treatment [Affective stability] : affective stability [Good treatment response/ compliance] : good treatment response/ compliance [de-identified] : Bri reports that she has been doing all right. 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 States that she continues to have some episodes of panic though has noticed that they have not increased in frequency or duration. Patient notices that triggers range from deadlines and stress. States that she may freeze or detach from others. Describes her partner as being very supportive; he helps her with panic attacks.\par \par Patient reports that she has moments where she feels activated yet anxious, and has some trouble focusing. She asked if this can be related to adult ADHD and asked to be screened for it. 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 \par \par Patient meditates daily and encouraged to incorporate sessions on self-compassion to reduce self criticism and judgment that often contributes to panic attacks. Patient also uses yoga, holds ice and tensing muscles to help ground herself and reduce distress. Will work to be less critical and judgemental of self.\par \par Patient has future goal of decreasing and stopping medications once stable. \par \par Patient denies current mood sxs. Denies depression (signaled by not being able to get out of bed). Denies SI, HI, NSSIB. Denies decreased need for sleep, euphoria, irritability, increase in goal directed or risky behavior and other sx of acute julisa.  [TextBox_32] : Low chronic and acute risk. Patient’s risk factors include history of substance use and psychiatric history, mitigated by engagement in job, family support, high motivation, and compliance with medications/treatment.

## 2022-11-17 NOTE — FAMILY HISTORY
[FreeTextEntry1] : Dad – depression - irritable, anti meds. Ok Therapy.  Mom- anxiety – chronic HCV (blood transfusion after MC at age 15 yo, dx 25 liver disease) s/p harvoni.  Cirrhosis.\par Sister – 33, OCPD, environmental. \par Extended fam h/o SAs, etoh/ mdd \par

## 2022-11-17 NOTE — REASON FOR VISIT
[Patient] : Patient [Follow-Up Visit] : a follow-up visit [Home] : at home, [unfilled] , at the time of the visit. [Medical Office: ([facility])___] : at the medical office located in  [Verbal consent obtained from patient] : the patient, [unfilled] [FreeTextEntry1] : medication management

## 2023-01-19 ENCOUNTER — APPOINTMENT (OUTPATIENT)
Dept: PSYCHIATRY | Facility: CLINIC | Age: 33
End: 2023-01-19

## 2023-02-02 ENCOUNTER — APPOINTMENT (OUTPATIENT)
Dept: PSYCHIATRY | Facility: CLINIC | Age: 33
End: 2023-02-02

## 2023-02-16 ENCOUNTER — APPOINTMENT (OUTPATIENT)
Dept: PSYCHIATRY | Facility: CLINIC | Age: 33
End: 2023-02-16
Payer: COMMERCIAL

## 2023-02-16 ENCOUNTER — OUTPATIENT (OUTPATIENT)
Dept: OUTPATIENT SERVICES | Facility: HOSPITAL | Age: 33
LOS: 1 days | End: 2023-02-16
Payer: COMMERCIAL

## 2023-02-16 DIAGNOSIS — F41.0 PANIC DISORDER [EPISODIC PAROXYSMAL ANXIETY]: ICD-10-CM

## 2023-02-16 DIAGNOSIS — F41.1 GENERALIZED ANXIETY DISORDER: ICD-10-CM

## 2023-02-16 PROCEDURE — ZZZZZ: CPT | Mod: 1L

## 2023-02-16 PROCEDURE — 99213 OFFICE O/P EST LOW 20 MIN: CPT | Mod: 95

## 2023-02-28 DIAGNOSIS — F41.1 GENERALIZED ANXIETY DISORDER: ICD-10-CM

## 2023-02-28 DIAGNOSIS — F41.0 PANIC DISORDER [EPISODIC PAROXYSMAL ANXIETY]: ICD-10-CM

## 2023-04-06 ENCOUNTER — OUTPATIENT (OUTPATIENT)
Dept: OUTPATIENT SERVICES | Facility: HOSPITAL | Age: 33
LOS: 1 days | End: 2023-04-06
Payer: COMMERCIAL

## 2023-04-06 ENCOUNTER — APPOINTMENT (OUTPATIENT)
Dept: PSYCHIATRY | Facility: CLINIC | Age: 33
End: 2023-04-06
Payer: COMMERCIAL

## 2023-04-06 DIAGNOSIS — F41.1 GENERALIZED ANXIETY DISORDER: ICD-10-CM

## 2023-04-06 DIAGNOSIS — F41.0 PANIC DISORDER [EPISODIC PAROXYSMAL ANXIETY]: ICD-10-CM

## 2023-04-06 PROCEDURE — ZZZZZ: CPT

## 2023-04-06 PROCEDURE — 99214 OFFICE O/P EST MOD 30 MIN: CPT | Mod: 95

## 2023-04-06 NOTE — ASSESSMENT
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  \par \par In interval from last follow up, patient reports that she had a depressive episode related to increased external stressors, mainly increased responsibilities related to phd program. Depressive episode was marked by inability to do much work as more work piled up; ended with use of adderall to provide motivation to get chores done and knowledge that adderral has helped her in the past. Pt used for a few days, not more than 40 mg daily, stating she stopped about 2 weeks ago and mood has stabilized. Acknowledges this caused feelings of shame and would not like to return to the habit. Patient is advised of risks of taking Adderall when not Rxed, especially at high doses and in combination w/ Wellbutrin, both for mental and physical health.  Patient notes understanding. PT also discussed insecurities, feelings of guilt, shame for which self compassion was discussed and encouarged to read works by Dr. Mujica, which she hopes to try. PT also interested in shorter intervals between next meeting to assist with mood at this time. No acute safety concerns endorsed. Patient does not meet criteria for active use disorder at this time and this level of care remains appropriate at this time.\par  \par Plan:\par 1.	continue w/ Prozac 80mg PO qdaily for OPAL / Panic Disorder \par 2.	Continue Wellbutrin to 300mg QD \par 3.             rtc in 4 weeks or earlier, if needed.\par 4.             encouraged to practice self compassion and review work of Dr. Mujica\par Total time with pt: 30 mins

## 2023-04-06 NOTE — CURRENT PSYCHIATRIC SYMPTOMS
[Depressed Mood] : no depressed mood [Anhedonia] : no anhedonia [Guilt] : not feeling guilty [Decreased Concentration] : no decrease in concentrating ability [Insomnia] : no insomnia disorder [Hyperphagia] : no hyperphagia [Hypersomnia] : no ~T hypersomnia [Psychomotor Retardation] : no psychomotor retardation [Euphoria] : no euphoria [Highly Irritable] : no high irritability [Distractibility] : not distracted [Dec Need For Sleep] : no decreased need for sleep [Delusions] : no ~T delusions [Hallucination Visual] : no visual hallucinations [Hallucination Auditory] : no auditory hallucinations [Excessive Worry] : no excessive worries [Ruminations] : no rumination disorder [Restlessness] : no restlessness [Panic] : no panic disorder [Recent Onset] : no recent onset of confusion

## 2023-04-06 NOTE — PHYSICAL EXAM
[None] : none [Anxious] : anxious [Appropriate] : appropriate [Oriented] : oriented [Normal] : good [Tics] : no tics [Tremor] : ~T no ~M tremor was seen [Rigidity] : no rigidity [Dystonia] : no dystonia was noted [Feeling Restless] : not feeling ~L restless [FreeTextEntry2] : Not assessed, telehealth [FreeTextEntry3] : Not assessed, telehealth [FreeTextEntry4] : None seen on camera [Inc Latency] : no increased latency [Pressured] : not pressured [Slowed] : not slowed [Soft] : not soft [Loud] : not loud [Talkative] : not talkative [Paucity] : no paucity [Mutism] : no mutism [Dysarthria] : no dysarthria [Stuttering] : no stuttering [Perseveration] : no perseveration [Rapid] : not rapid [Delayed] : not delayed [Illogical tangential] : no logical tangential [Loose Circumstantial] : no loose circumstantial [Impoverished] : content/associations not impoverished [Impaired] : abstract reasoning not impaired [AH] : no auditory hallucinations [VH] : no visual hallucinations [Suicidal Ideation] : no suicidal ideation [Homicidal Ideation] : no homicidal ideation [Afraid] : not afraid [Irritable] : no irritable [Dysphoric] : not dysphoric [Constricted] : not constricted [Labile] : not labile [Lethargic] : no lethargic [Unarousable] : not unarousable [FreeTextEntry1] : well groomed, visualized on screen, good eye contact, private room [FreeTextEntry8] : "better now" [FreeTextEntry9] : Euthymic though somewhat anxious which appears to be baseline

## 2023-04-06 NOTE — REASON FOR VISIT
[Patient] : Patient [Follow-Up Visit] : a follow-up visit [Home] : at home, [unfilled] , at the time of the visit. [Medical Office: ([facility])___] : at the medical office located in  [Verbal consent obtained from patient] : the patient, [unfilled] [FreeTextEntry1] : medication management

## 2023-04-06 NOTE — HISTORY OF PRESENT ILLNESS
[No] : no [Responsibility to family or others] : responsibility to family or others [Identifies reasons for living] : identifies reasons for living [Future oriented] : future oriented [Engaged in work or school] : engaged in work or school [Supportive social network or family] : supportive social network or family [Positive therapeutic relationships] : positive therapeutic relationships [Ability to cope with stress] : ability to cope with stress [None Known] : none known [Substance abuse] : substance abuse [Residential stability] : residential stability [Relationship stability] : relationship stability [Engagement in treatment] : engagement in treatment [Affective stability] : affective stability [Good treatment response/ compliance] : good treatment response/ compliance [FreeTextEntry3] : \par  [de-identified] : Bri reports that she has been doing all right this week and a half; however, reported use of adderall borrowed from someone else recently following periods of low mood and feeling overwhelmed. Patient states that she has had a difficult time due to having increase in school related responsibilities. States that she felt the work was so much, she did not know where to start, letting it pile up. Eventually, she says she "turned to old habits" meaning she found adderall from a friend and took a max of 40 mg daily until she was able to get her work done. Describes feeling shame for use, stating she did not missue the medication as she had done in the past and feeling badly that she was not forthcoming with her boyfriend. States that her use was far less compared to the past when she would take adderall continuously; acknowledges this is a habit she would like to change and not return to. She describes phsycial impact of adderall use that impedes her continuous use. Discussed methods to prevent build up of work and encouraged to reach out to provider when feelings of depression are impacting ability to manage. Also discussed coping skills, which she is able to list many, and supports she can turn to, who she also was able to list several, and encouraged to improve self compassion, pt to explore work of Dr. LUIS Mujica, who she had previously heard about through a podcast. Pt described feelings of insecurity that may serve as a barrier to reach out when help is needed; willing to increase touchpoint with provider.\par \par Patient denies current mood sxs. Denies current active depression (signaled by not being able to get out of bed). Denies SI, HI, NSSIB. Denies decreased need for sleep, euphoria, irritability, increase in goal directed or risky behavior and other sx of acute julisa.  [TextBox_32] : Low chronic and acute risk. Patient’s risk factors include history of substance use and psychiatric history, mitigated by engagement in job, family support, high motivation, and compliance with medications/treatment.

## 2023-04-07 DIAGNOSIS — F41.1 GENERALIZED ANXIETY DISORDER: ICD-10-CM

## 2023-04-07 DIAGNOSIS — F41.0 PANIC DISORDER [EPISODIC PAROXYSMAL ANXIETY]: ICD-10-CM

## 2023-05-05 ENCOUNTER — APPOINTMENT (OUTPATIENT)
Dept: PSYCHIATRY | Facility: CLINIC | Age: 33
End: 2023-05-05

## 2023-06-21 NOTE — PHYSICAL EXAM
[None] : none [Anxious] : anxious [Appropriate] : appropriate [Oriented] : oriented [Normal] : good [Tics] : no tics [Tremor] : ~T no ~M tremor was seen [Rigidity] : no rigidity [Dystonia] : no dystonia was noted [Feeling Restless] : not feeling ~L restless [FreeTextEntry2] : Not assessed, telehealth [FreeTextEntry3] : Not assessed, telehealth [FreeTextEntry4] : None seen on camera [Inc Latency] : no increased latency [Pressured] : not pressured [Slowed] : not slowed [Soft] : not soft [Loud] : not loud [Talkative] : not talkative [Paucity] : no paucity [Mutism] : no mutism [Dysarthria] : no dysarthria [Stuttering] : no stuttering [Perseveration] : no perseveration [Rapid] : not rapid [Delayed] : not delayed [Illogical tangential] : no logical tangential [Loose Circumstantial] : no loose circumstantial [Impoverished] : content/associations not impoverished [Impaired] : abstract reasoning not impaired [AH] : no auditory hallucinations [VH] : no visual hallucinations [Suicidal Ideation] : no suicidal ideation [Homicidal Ideation] : no homicidal ideation [Afraid] : not afraid [Irritable] : no irritable [Dysphoric] : not dysphoric [Constricted] : not constricted [Labile] : not labile [Lethargic] : no lethargic [Unarousable] : not unarousable [FreeTextEntry1] : well groomed, visualized on screen, good eye contact, private room [FreeTextEntry8] : "stable" [FreeTextEntry9] : Euthymic though somewhat anxious which appears to be baseline

## 2023-06-21 NOTE — ASSESSMENT
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  \par \par In interval from last follow up, patient notes stable mood. Reported recent visit to [place] where she was able to take care of her parents but noted increase in anxiety at that time. She notes improvement since her return. Continues to have panic attacks without increase in frequency, now describes as feeling as if she is dissociating, very negative and weak. Given stability and patient utilizing coping skills with good effect, no indication for medication change at this time and patient is agreeable to this. She remains abstinent from sedative/hypnotics and stimulants.  She notes continued marijuana use sporadically w/ no motivation to cut down further at this time.  Psychoeducation regarding mental health effects of cannabis provided.  Patient is advised of risks of taking Adderall when not Rxed, especially at high doses and in combination w/ Wellbutrin, both for mental and physical health.  Patient notes understanding. No acute safety concerns endorsed. Patient does not meet criteria for active use disorder at this time and this level of care remains appropriate at this time.\par  \par Plan:\par 1.	continue w/ Prozac 80mg PO qdaily for OPAL / Panic Disorder \par 2.	Continue Wellbutrin to 300mg QD \par 3.             rtc in 7 weeks or earlier, if needed.

## 2023-06-21 NOTE — REASON FOR VISIT
[Patient] : Patient [Follow-Up Visit] : a follow-up visit [Home] : at home, [unfilled] , at the time of the visit. [Medical Office: ([facility])___] : at the medical office located in  [Verbal consent obtained from patient] : the patient, [unfilled] [FreeTextEntry1] : medication management

## 2023-06-21 NOTE — HISTORY OF PRESENT ILLNESS
[No] : no [Responsibility to family or others] : responsibility to family or others [Identifies reasons for living] : identifies reasons for living [Future oriented] : future oriented [Engaged in work or school] : engaged in work or school [Supportive social network or family] : supportive social network or family [Positive therapeutic relationships] : positive therapeutic relationships [Ability to cope with stress] : ability to cope with stress [None Known] : none known [Substance abuse] : substance abuse [Residential stability] : residential stability [Relationship stability] : relationship stability [Engagement in treatment] : engagement in treatment [Affective stability] : affective stability [Good treatment response/ compliance] : good treatment response/ compliance [de-identified] : Bri reports that she has been doing all right. Since the last appt, she visited [place] to see her parent for about 1 month. Patient states that her mother's health is frail and has spent time taking care of her. In [place], she reported increased anxiety and feeling worry that her mother was suffering and her father was feeling badly because his wife is ill. In [place], she reported having difficulty sleeping as she was unable to smoke marijuana to help her calm down before sleeping, which she uses a few times per week when in her own home. She therefore used trazodone (stated she had left over supply from past) but did not feel it had the same effect as cannabis. Patient also stated she even tried having a few shots to help her sleep but stopped after seeing it did not have a good effect to the quality of her sleep. 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 States that she continues to have some episodes of panic though has noticed that they have not increased in frequency or duration. Patient notices that triggers range from deadlines and stress. States that she may freeze or detach from others. Describes her partner as being very supportive; he helps her with panic attacks. Pt denies use of stimulants.\par \par Patient meditates daily and encouraged to incorporate sessions on self-compassion to reduce self criticism and judgment that often contributes to panic attacks. Patient also uses yoga, holds ice and tensing muscles to help ground herself and reduce distress. Will work to be less critical and judgemental of self.\par \par Patient has future goal of decreasing and stopping medications once stable. \par \par Patient denies current mood sxs. Denies depression (signaled by not being able to get out of bed). Denies SI, HI, NSSIB. Denies decreased need for sleep, euphoria, irritability, increase in goal directed or risky behavior and other sx of acute julisa.  [TextBox_32] : Low chronic and acute risk. Patient’s risk factors include history of substance use and psychiatric history, mitigated by engagement in job, family support, high motivation, and compliance with medications/treatment.

## 2023-06-21 NOTE — PAST MEDICAL HISTORY
[FreeTextEntry1] : No h/o IPP, SA/SHB\par 2016: OPD Bk for dep/anx, Wellbutrin, lexapro + vistaril \par 2015: became addicted to Adderall (thru gf)\par 2013-14: tried [employer] Wellbutrin 150-300mg\par 2011:  (during 1st Masters program in [place]) 1st time Rxed meds for panic attacks, lexparo/Xanax x 1-2 yrs. 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

## 2023-07-28 ENCOUNTER — OUTPATIENT (OUTPATIENT)
Dept: OUTPATIENT SERVICES | Facility: HOSPITAL | Age: 33
LOS: 1 days | End: 2023-07-28
Payer: COMMERCIAL

## 2023-07-28 ENCOUNTER — APPOINTMENT (OUTPATIENT)
Dept: PSYCHIATRY | Facility: CLINIC | Age: 33
End: 2023-07-28
Payer: COMMERCIAL

## 2023-07-28 DIAGNOSIS — F41.1 GENERALIZED ANXIETY DISORDER: ICD-10-CM

## 2023-07-28 DIAGNOSIS — F41.0 PANIC DISORDER [EPISODIC PAROXYSMAL ANXIETY]: ICD-10-CM

## 2023-07-28 PROCEDURE — 99215 OFFICE O/P EST HI 40 MIN: CPT

## 2023-07-28 NOTE — PHYSICAL EXAM
[None] : none [Anxious] : anxious [Appropriate] : appropriate [Oriented] : oriented [Normal] : good [Tics] : no tics [Tremor] : ~T no ~M tremor was seen [Rigidity] : no rigidity [Dystonia] : no dystonia was noted [Feeling Restless] : not feeling ~L restless [Inc Latency] : no increased latency [Pressured] : not pressured [Slowed] : not slowed [Soft] : not soft [Loud] : not loud [Talkative] : not talkative [Paucity] : no paucity [Mutism] : no mutism [Dysarthria] : no dysarthria [Stuttering] : no stuttering [Perseveration] : no perseveration [Rapid] : not rapid [Delayed] : not delayed [Illogical tangential] : no logical tangential [Loose Circumstantial] : no loose circumstantial [Impoverished] : content/associations not impoverished [Impaired] : abstract reasoning not impaired [AH] : no auditory hallucinations [VH] : no visual hallucinations [Suicidal Ideation] : no suicidal ideation [Homicidal Ideation] : no homicidal ideation [Afraid] : not afraid [Irritable] : no irritable [Dysphoric] : not dysphoric [Constricted] : not constricted [Labile] : not labile [Lethargic] : no lethargic [Unarousable] : not unarousable [FreeTextEntry8] : "better"  [FreeTextEntry9] : Euthymic though somewhat anxious which appears to be baseline

## 2023-07-28 NOTE — ASSESSMENT
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 Presenting for f/u for med management. \par \par In interval from last follow up, patient reports that she had a depressive episode related to increased external stressors, mainly increased responsibilities related to phd program. Depressive episode was marked by inability to do much work as more work piled up; ended with use of adderall and vyvanse to provide motivation to get chores done and knowledge that adderall has helped her in the past. Pt used for a few days, not more than 40 mg vyvanse daily; denies Sx of julisa but reports increased anxiety. Acknowledges this caused feelings of shame and would not like to return to the habit. Patient is advised of risks of taking Adderall when not Rxed, especially at high doses and in combination w/ Wellbutrin, both for mental and physical health.  Patient notes understanding. PT also discussed insecurities, feelings of guilt, shame for which self compassion was discussed. Pt also interested in shorter intervals between next meeting to assist with mood at this time. No acute safety concerns endorsed. Patient does not meet criteria for active use disorder at this time and this level of care remains appropriate at this time.\par \par Pt reports that she still has medications at home, which raises concern for medication nonadherence given that scripts were not sent recently. Pt reports that she will stop the vyvanse and adderall and continue to take her antidepressants. Not using trazodone due to migraines 2/2 to use. Will discuss med changes at next visit; will send an ADHD survey to pt to complete. Pt going away next week for travels; to follow up in 3 weeks. \par  \par Plan:\par 1.	continue w/ Prozac 80mg PO qdaily for OPAL / Panic Disorder \par 2.	Continue Wellbutrin to 300mg QD \par 3.             rtc in 3 weeks\par 4.             encouraged to practice self compassion and review work of Dr. Mujica\par Total time with pt: 45 min

## 2023-07-28 NOTE — HISTORY OF PRESENT ILLNESS
[No] : no [Responsibility to family or others] : responsibility to family or others [Identifies reasons for living] : identifies reasons for living [Future oriented] : future oriented [Engaged in work or school] : engaged in work or school [Supportive social network or family] : supportive social network or family [Positive therapeutic relationships] : positive therapeutic relationships [Ability to cope with stress] : ability to cope with stress [None Known] : none known [Substance abuse] : substance abuse [Residential stability] : residential stability [Relationship stability] : relationship stability [Engagement in treatment] : engagement in treatment [Affective stability] : affective stability [Good treatment response/ compliance] : good treatment response/ compliance [FreeTextEntry3] : \par  [de-identified] : Bri reports that 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 Eventually, she says she found vyvanse from a friend and took a max of 40 mg daily, on and off, most recently being for the past 5 days, until she was able to get her work done. She has also used adderall for 21 days preceding this. States that her use was far less compared to the past when she would take adderall continuously; acknowledges this is a habit she would like to change and not return to. She describes jitteriness and worsening of her anxiety, which impedes her continuous use. Discussed methods to prevent build up of work and encouraged to reach out to provider when feelings of depression are impacting ability to manage. Pt also continuing to smoke marijuana on occasion, to unwind and sleep at the end of the day.\par \par Patient reports that she has always had a low level of depression and anxiety for as long as she could recall, given familial hardships related to mother's medical problems; feels supported by boyfriend of 6 years and feels safe in her environment. Denies SI, HI, NSSIB. Denies decreased need for sleep, euphoria, irritability, increase in goals and other sx of acute julisa. Pt reports that she still has medications at home, which raises concern for medication nonadherence given that scripts were not sent recently. Pt reports that she will stop the vyvanse and adderall and continue to take her antidepressants. Not using trazodone due to migraines 2/2 to use. Will discuss med changes at next visit; will send an ADHD survey to pt to complete. [TextBox_32] : Low chronic and acute risk. Patient’s risk factors include history of substance use and psychiatric history, mitigated by engagement in job, family support, high motivation, and compliance with medications/treatment.

## 2023-07-28 NOTE — FAMILY HISTORY
I spoke with the patient and she agreed to the new date of 07-31.     [FreeTextEntry1] : Dad – depression - irritable, anti meds. Ok Therapy.  Mom- anxiety – chronic HCV (blood transfusion after MC at age 15 yo, dx 23 liver disease) s/p harvoni.  Cirrhosis.\par Sister – 33, [employer], environmental. \par Extended fam h/o SAs, etoh/ mdd \par

## 2023-07-28 NOTE — REASON FOR VISIT
[Patient] : Patient [Follow-Up Visit] : a follow-up visit [Home] : at home, [unfilled] , at the time of the visit. [Medical Office: ([facility])___] : at the medical office located in  [Verbal consent obtained from patient] : the patient, [unfilled] [FreeTextEntry1] : medication management

## 2023-07-28 NOTE — CURRENT PSYCHIATRIC SYMPTOMS
[Depressed Mood] : depressed mood [Anhedonia] : no anhedonia [Guilt] : not feeling guilty [Decreased Concentration] : no decrease in concentrating ability [Hyperphagia] : no hyperphagia [Insomnia] : no insomnia disorder [Hypersomnia] : no ~T hypersomnia [Psychomotor Retardation] : no psychomotor retardation [Euphoria] : no euphoria [Highly Irritable] : no high irritability [Distractibility] : not distracted [Dec Need For Sleep] : no decreased need for sleep [Delusions] : no ~T delusions [Hallucination Visual] : no visual hallucinations [Hallucination Auditory] : no auditory hallucinations [Excessive Worry] : no excessive worries [Ruminations] : ruminations [Restlessness] : no restlessness [Panic] : no panic disorder [Recent Onset] : no recent onset of confusion

## 2023-07-29 DIAGNOSIS — F41.1 GENERALIZED ANXIETY DISORDER: ICD-10-CM

## 2023-07-29 DIAGNOSIS — F41.0 PANIC DISORDER [EPISODIC PAROXYSMAL ANXIETY]: ICD-10-CM

## 2023-08-17 ENCOUNTER — APPOINTMENT (OUTPATIENT)
Dept: PSYCHIATRY | Facility: CLINIC | Age: 33
End: 2023-08-17

## 2023-10-25 ENCOUNTER — APPOINTMENT (OUTPATIENT)
Dept: PSYCHIATRY | Facility: CLINIC | Age: 33
End: 2023-10-25
Payer: COMMERCIAL

## 2023-10-25 ENCOUNTER — OUTPATIENT (OUTPATIENT)
Dept: OUTPATIENT SERVICES | Facility: HOSPITAL | Age: 33
LOS: 1 days | End: 2023-10-25
Payer: COMMERCIAL

## 2023-10-25 DIAGNOSIS — F41.1 GENERALIZED ANXIETY DISORDER: ICD-10-CM

## 2023-10-25 PROCEDURE — 99214 OFFICE O/P EST MOD 30 MIN: CPT | Mod: 95

## 2023-10-25 PROCEDURE — ZZZZZ: CPT

## 2023-10-26 DIAGNOSIS — F41.1 GENERALIZED ANXIETY DISORDER: ICD-10-CM

## 2023-11-07 ENCOUNTER — APPOINTMENT (OUTPATIENT)
Dept: PSYCHIATRY | Facility: CLINIC | Age: 33
End: 2023-11-07

## 2023-12-19 ENCOUNTER — OUTPATIENT (OUTPATIENT)
Dept: OUTPATIENT SERVICES | Facility: HOSPITAL | Age: 33
LOS: 1 days | End: 2023-12-19
Payer: COMMERCIAL

## 2023-12-19 ENCOUNTER — APPOINTMENT (OUTPATIENT)
Dept: PSYCHIATRY | Facility: CLINIC | Age: 33
End: 2023-12-19
Payer: COMMERCIAL

## 2023-12-19 DIAGNOSIS — F41.0 PANIC DISORDER [EPISODIC PAROXYSMAL ANXIETY]: ICD-10-CM

## 2023-12-19 DIAGNOSIS — F41.1 GENERALIZED ANXIETY DISORDER: ICD-10-CM

## 2023-12-19 DIAGNOSIS — F90.9 ATTENTION-DEFICIT HYPERACTIVITY DISORDER, UNSPECIFIED TYPE: ICD-10-CM

## 2023-12-19 PROCEDURE — 99215 OFFICE O/P EST HI 40 MIN: CPT

## 2023-12-19 PROCEDURE — ZZZZZ: CPT

## 2023-12-19 NOTE — HISTORY OF PRESENT ILLNESS
[No] : no [Responsibility to family or others] : responsibility to family or others [Identifies reasons for living] : identifies reasons for living [Future oriented] : future oriented [Engaged in work or school] : engaged in work or school [Supportive social network or family] : supportive social network or family [Positive therapeutic relationships] : positive therapeutic relationships [Ability to cope with stress] : ability to cope with stress [None Known] : none known [Substance abuse] : substance abuse [Residential stability] : residential stability [Relationship stability] : relationship stability [Engagement in treatment] : engagement in treatment [Affective stability] : affective stability [Good treatment response/ compliance] : good treatment response/ compliance [FreeTextEntry3] : \par   [de-identified] : Pt seen in person for total of 45 minutes. 30 minutes spent on evaluation for ADHD Sx and 15 minutes spent on psychoeducation and discussion of medication management.  Pt was 15 minutes late to her appointment. She reports that since her visit in October, she has not used any Adderall. States that she has continued the lowered dose of wellbutrin, 150 mg, and prozac at 80 mg daily. She denies any worsening of mood or anxiety and states "things have been okay." She laments that she notices a significant decline in her productivity compared to when she was taking Adderall, but she reports understanding the importance of avoiding illicit use, especially in light of today's evaluation. Pt also reports significantly decreasing marijuana usage, now taking 1 gummy at night. Reports social alcohol usage on weekends (1-2 glasses of wine) and denies misuse, binge-drinking, etc. Denies any other substance use and denies overusing Adderall that she received from friends in the past; reports maximum of 40 mg in a day for Adderall (IR) and 40 mg of vyvanse in a day (reports she has only used vyvanse sporadically, not long enough to remember the effect.) Pt states that adderall sometimes seemed to help with her anxiety but it would make her heart race. Reports she is usually normotensive (120/80s, HR of 70s) and denies any heart conditions or diffculty with appetite.   On deeper Hx, pt reports a detailed chronology of impulsive, risky behavior as a child (sexual promiscuity and other behavior described by "not appreciating the full risks of something and struggling with decision-making") Pt is tangential at times and overinclusive with overproductive speech. Reports she is tense and anxious during the interview. Frequently, her parents were informed that the pt did not perform at the level of her potential as a child by teachers, and frequently struggled with time management and handing in assignments on time. Pt reports, even as an adult, procrastinating and being able to do the bulk of her work only at last minute, when she had the panic setting in. Reports as an adult also frequently losing things, always being late to appointments, and needing more time for work. 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   Reports otherwise mood has been stable. Denies SI, HI, NSSIB. Denies decreased need for sleep, euphoria, irritability, increase in goals and other sx of acute julisa. Occasionally using prescribed trazodone 50 mg for sleep. Pt no longer taking prescribed propranolol for performance anxiety. [TextBox_32] : Low chronic and acute risk. Patient's risk factors include history of substance use and psychiatric history, mitigated by engagement in job, family support, high motivation, and compliance with medications/treatment.

## 2023-12-19 NOTE — BEHAVIORAL HEALTH
[Prevent psychological harm to patient or another individual] : Prevent psychological harm to patient or another individual

## 2023-12-19 NOTE — CURRENT PSYCHIATRIC SYMPTOMS
[Ruminations] : ruminations [Depressed Mood] : no depressed mood [Anhedonia] : no anhedonia [Guilt] : not feeling guilty [Decreased Concentration] : decreased concentrating ability [Hyperphagia] : no hyperphagia [Insomnia] : no insomnia disorder [Hypersomnia] : no ~T hypersomnia [Psychomotor Agitation] : psychomotor agitation [Psychomotor Retardation] : no psychomotor retardation [Euphoria] : no euphoria [Highly Irritable] : no high irritability [Increased Activity] : increased activity [Distractibility] : distractibility [Talkativeness] : talkativeness [Dec Need For Sleep] : no decreased need for sleep [Delusions] : no ~T delusions [Hallucination Visual] : no visual hallucinations [Hallucination Auditory] : no auditory hallucinations [Excessive Worry] : no excessive worries [Restlessness] : restlessness [Panic] : no panic disorder [Recent Onset] : no recent onset of confusion

## 2023-12-19 NOTE — PHYSICAL EXAM
[None] : none [Anxious] : anxious [Appropriate] : appropriate [Oriented] : oriented [Normal] : normal [Tics] : no tics [Tremor] : ~T no ~M tremor was seen [Rigidity] : no rigidity [Dystonia] : no dystonia was noted [Feeling Restless] : feeling ~L restless [Inc Latency] : no increased latency [Pressured] : not pressured [Slowed] : not slowed [Soft] : not soft [Loud] : not loud [Talkative] : talkative [Paucity] : no paucity [Mutism] : no mutism [Dysarthria] : no dysarthria [Stuttering] : no stuttering [Perseveration] : no perseveration [Rapid] : rapid [Delayed] : not delayed [Illogical tangential] : no logical tangential [Loose Circumstantial] : no loose circumstantial [Impoverished] : content/associations not impoverished [Impaired] : abstract reasoning not impaired [AH] : no auditory hallucinations [VH] : no visual hallucinations [Suicidal Ideation] : no suicidal ideation [Homicidal Ideation] : no homicidal ideation [Afraid] : not afraid [Irritable] : no irritable [Dysphoric] : not dysphoric [Constricted] : not constricted [Labile] : not labile [Lethargic] : no lethargic [Unarousable] : not unarousable [FreeTextEntry5] : animated [FreeTextEntry6] : somewhat tangential, overinclusive [FreeTextEntry8] : "overall okay" [de-identified] : some deficits in attention and concentration, staying on topic

## 2023-12-19 NOTE — PAST MEDICAL HISTORY
[FreeTextEntry1] : No h/o IPP, SA/SHB\par  2016: OPD Bk for dep/anx, Wellbutrin, lexapro + vistaril \par  2015: became addicted to Adderall (thru gf)\par  2013-14: tried Gfs Wellbutrin 150-300mg\par  2011:  (during 1st Masters program in [place]) 1st time Rxed meds for panic attacks, lexparo/Xanax x 1-2 yrs. 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

## 2023-12-19 NOTE — ASSESSMENT
[FreeTextEntry1] : 34 y/o single [place] American woman, with two Masters in lit/writing, started PhD program in fall of 2020 (currently active in [place]), domiciled with her partner, with no significant PMH and PPH of depression, anxiety, panic attacks and polysubstance misuse (sedative / hypnotic and stimulant), in remission w/ recent slip in 7/2021 and 3/2023, cannabis user (simran), treated as an outpatient since adolescence, who was referred to [facility] OPD years ago after presenting to the ED for a medication refill of her Wellbutrin and Lexapro in the context of increased stress. Presenting for f/u for med management.   Since last visit, pt reports that she stopped vyvanse and adderall IR which she was getting from friends, and reports compliance with her antidepressants. Occasionally using trazodone 50 mg for sleep and has stopped propranolol 10 mg BID for performance anxiety. Extensive Hx and evaluation performed today for ADHD dx. Pt at this time sufficiently meets criteria for adult ADHD, and will be started on vyvanse; pt has never overused vyvanse or adderall, or other substances, though she acknowledges that vyvanse and adderall were illicitly obtained to self-medicate. RAB discussed for starting vyvanse and continuing antidepressants; pt agrees to regular f/u and other tests required to continue vyvanse.   Plan: - start vyvanse 30 mg daily; RAB discussed. - will send for EKG in 3 mo - continue w/ Prozac 80mg PO qdaily for OPAL / Panic Disorder  - continue Wellbutrin 150 mg daily - rtc in 3 weeks  Total time with pt: 45 min

## 2023-12-19 NOTE — FAMILY HISTORY
[FreeTextEntry1] : Dad - depression - irritable, anti meds. Ok Therapy.  Mom- anxiety - chronic HCV (blood transfusion after MC at age 15 yo, dx 23 liver disease) s/p harvoni.  Cirrhosis.\par  Sister - 33, OCPD, environmental. \par  Extended fam h/o SAs, etoh/ mdd \par

## 2023-12-19 NOTE — RESULTS/DATA
[FreeTextEntry1] : (3/23/18)Chol 208.  Lipid profile otherwise wnl.  CBC, CMP - wnl. TSH/FT4 - wnl. Vit D 28. \par  Exams and Consultations: previous provider discussed March 2018 EKG results with PMD, RAD/ "pulmonary disease pattern," repeat EKG performed 6/4/18, revealed normal variant with vertical axis.\par

## 2023-12-20 DIAGNOSIS — F90.9 ATTENTION-DEFICIT HYPERACTIVITY DISORDER, UNSPECIFIED TYPE: ICD-10-CM

## 2023-12-20 DIAGNOSIS — F41.0 PANIC DISORDER [EPISODIC PAROXYSMAL ANXIETY]: ICD-10-CM

## 2023-12-20 DIAGNOSIS — F41.1 GENERALIZED ANXIETY DISORDER: ICD-10-CM

## 2024-01-12 ENCOUNTER — OUTPATIENT (OUTPATIENT)
Dept: OUTPATIENT SERVICES | Facility: HOSPITAL | Age: 34
LOS: 1 days | End: 2024-01-12
Payer: COMMERCIAL

## 2024-01-12 ENCOUNTER — APPOINTMENT (OUTPATIENT)
Dept: PSYCHIATRY | Facility: CLINIC | Age: 34
End: 2024-01-12
Payer: COMMERCIAL

## 2024-01-12 VITALS
SYSTOLIC BLOOD PRESSURE: 138 MMHG | RESPIRATION RATE: 18 BRPM | HEART RATE: 86 BPM | DIASTOLIC BLOOD PRESSURE: 68 MMHG | OXYGEN SATURATION: 99 % | WEIGHT: 150 LBS | TEMPERATURE: 98.2 F

## 2024-01-12 DIAGNOSIS — F90.9 ATTENTION-DEFICIT HYPERACTIVITY DISORDER, UNSPECIFIED TYPE: ICD-10-CM

## 2024-01-12 DIAGNOSIS — F41.1 GENERALIZED ANXIETY DISORDER: ICD-10-CM

## 2024-01-12 DIAGNOSIS — F41.0 PANIC DISORDER [EPISODIC PAROXYSMAL ANXIETY]: ICD-10-CM

## 2024-01-12 DIAGNOSIS — Z00.00 ENCOUNTER FOR GENERAL ADULT MEDICAL EXAMINATION W/OUT ABNORMAL FINDINGS: ICD-10-CM

## 2024-01-12 PROCEDURE — ZZZZZ: CPT

## 2024-01-12 PROCEDURE — 99214 OFFICE O/P EST MOD 30 MIN: CPT
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
## 2024-01-12 NOTE — HISTORY OF PRESENT ILLNESS
[No] : no [Responsibility to family or others] : responsibility to family or others [Identifies reasons for living] : identifies reasons for living [Future oriented] : future oriented [Engaged in work or school] : engaged in work or school [Supportive social network or family] : supportive social network or family [Positive therapeutic relationships] : positive therapeutic relationships [Ability to cope with stress] : ability to cope with stress [None Known] : none known [Substance abuse] : substance abuse [Residential stability] : residential stability [Relationship stability] : relationship stability [Engagement in treatment] : engagement in treatment [Affective stability] : affective stability [Good treatment response/ compliance] : good treatment response/ compliance [FreeTextEntry3] : \par   [de-identified] : Pt seen in person for total of 30 minutes. Pt received vitals today; BP of 138/68, HR 86. Wt: 150 lbs.   Pt arrived on time for her appointment today. She reports that vyvanse has been helping her significantly; reports she is less anxious, able to stay on task, less overwhelmed with tasks, and less impulsive. Reports she used to impulsively eat, which has stopped. Endorses improved time management. States that her partner has remarked that she is "more helpful", clarified as more attentive to his requests and less irritable. 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 States she has decreased cannabis use to 1 gummy at night and "1 hit" of a joint once a week; psychoeducation provided on risks of cannabis use and impact on anxiety and focus. Pt reports that she uses hydroxyzine rarely to help her sleep as she feels overall more "energized and activated." She reports a total of 7h of sleep at night and about 2h of naps. Also endorses that her "tics" have quieted (picking at split ends, picking at her face, etc).   Pt also reports that she occasionally feels her heart racing but is unsure whether this is from anxiety or from the vyanse. Pt's vitals were WNL today. EKG referral given to obtain before next appointment. She denies other AE, including chest pain, Sx of julisa, agitation, psychosis, serotonin syndrome, or increased anxiety. Pt tried to taper down Prozac as discussed at last appt, but had to increase from 60 to 70 mg. Agreeable to DC wellbutrin today and then gradually attempt to downtaper prozac. RAB discussed.   Reports otherwise mood has been stable. Denies SI, HI, NSSIB.  [TextBox_32] : Low chronic and acute risk. Patient's risk factors include history of substance use and psychiatric history, mitigated by engagement in job, family support, high motivation, and compliance with medications/treatment.

## 2024-01-12 NOTE — ASSESSMENT
[FreeTextEntry1] : 34 y/o single [place] American woman, with two Masters in lit/writing, started PhD program in fall of 2020 (currently active in [place]), domiciled with her partner, with no significant PMH and PPH of depression, anxiety, panic attacks and polysubstance misuse (sedative / hypnotic and stimulant), in remission w/ recent slip in 7/2021 and 3/2023, cannabis user (simran), treated as an outpatient since adolescence, who was referred to [facility] OPD years ago after presenting to the ED for a medication refill of her Wellbutrin and Lexapro in the context of increased stress. Presenting for f/u for med management.   Extensive Hx and evaluation performed for ADHD dx. Pt sufficiently meets criteria for adult ADHD, and was started on vyvanse; pt has never overused vyvanse or adderall, or other substances, though she acknowledges that vyvanse and adderall were illicitly obtained to self-medicate in the past.   Pt reports that she occasionally feels her heart racing but is unsure whether this is from anxiety or from the vyanse. Pt's vitals were WNL today. EKG referral given to obtain before next appointment. She denies other AE, including chest pain, Sx of julisa, agitation, psychosis, serotonin syndrome, or increased anxiety. Pt tried to taper down Prozac as discussed at last appt, but had to increase from 60 to 70 mg. Agreeable to DC wellbutrin today and then gradually attempt to downtaper prozac. RAB discussed, pt agrees to plan.  Plan: - continue vyvanse 30 mg daily - obtain EKG - obtain BP monitor for regular at home monitoring of BP and HR - continue to taper down Prozac, now at 70mg PO qdaily, for OPAL / Panic Disorder, in 1-2 weeks by 10-20 mg at a time. Schedule discussed with pt.  - discontinue Wellbutrin 150 mg daily - rtc in 4 weeks  Total time with pt: 30 min

## 2024-01-12 NOTE — PHYSICAL EXAM
[None] : none [Feeling Restless] : feeling ~L restless [Anxious] : anxious [Appropriate] : appropriate [Oriented] : oriented [Normal] : good [Tics] : no tics [Tremor] : ~T no ~M tremor was seen [Rigidity] : no rigidity [Dystonia] : no dystonia was noted [Inc Latency] : no increased latency [Pressured] : not pressured [Slowed] : not slowed [Soft] : not soft [Loud] : not loud [Talkative] : not talkative [Paucity] : no paucity [Mutism] : no mutism [Dysarthria] : no dysarthria [Stuttering] : no stuttering [Perseveration] : no perseveration [Rapid] : not rapid [Delayed] : not delayed [Illogical tangential] : no logical tangential [Loose Circumstantial] : no loose circumstantial [Impoverished] : content/associations not impoverished [Impaired] : abstract reasoning not impaired [AH] : no auditory hallucinations [VH] : no visual hallucinations [Suicidal Ideation] : no suicidal ideation [Homicidal Ideation] : no homicidal ideation [Afraid] : not afraid [Irritable] : no irritable [Dysphoric] : not dysphoric [Constricted] : not constricted [Labile] : not labile [Lethargic] : no lethargic [Unarousable] : not unarousable [FreeTextEntry5] : animated, less so today [FreeTextEntry8] : "overall good" [de-identified] : improved

## 2024-01-12 NOTE — CURRENT PSYCHIATRIC SYMPTOMS
[Decreased Concentration] : decreased concentrating ability [Psychomotor Agitation] : psychomotor agitation [Increased Activity] : increased activity [Distractibility] : distractibility [Talkativeness] : talkativeness [Ruminations] : ruminations [Depressed Mood] : no depressed mood [Anhedonia] : no anhedonia [Guilt] : not feeling guilty [Hyperphagia] : no hyperphagia [Insomnia] : no insomnia disorder [Hypersomnia] : no ~T hypersomnia [Psychomotor Retardation] : no psychomotor retardation [Euphoria] : no euphoria [Highly Irritable] : no high irritability [Dec Need For Sleep] : no decreased need for sleep [Delusions] : no ~T delusions [Hallucination Visual] : no visual hallucinations [Hallucination Auditory] : no auditory hallucinations [Excessive Worry] : no excessive worries [Restlessness] : no restlessness [Panic] : no panic disorder [Recent Onset] : no recent onset of confusion

## 2024-01-13 DIAGNOSIS — F41.1 GENERALIZED ANXIETY DISORDER: ICD-10-CM

## 2024-01-13 DIAGNOSIS — F90.9 ATTENTION-DEFICIT HYPERACTIVITY DISORDER, UNSPECIFIED TYPE: ICD-10-CM

## 2024-01-13 DIAGNOSIS — F41.0 PANIC DISORDER [EPISODIC PAROXYSMAL ANXIETY]: ICD-10-CM

## 2024-02-09 ENCOUNTER — APPOINTMENT (OUTPATIENT)
Dept: PSYCHIATRY | Facility: CLINIC | Age: 34
End: 2024-02-09
Payer: COMMERCIAL

## 2024-02-09 ENCOUNTER — OUTPATIENT (OUTPATIENT)
Dept: OUTPATIENT SERVICES | Facility: HOSPITAL | Age: 34
LOS: 1 days | End: 2024-02-09
Payer: COMMERCIAL

## 2024-02-09 DIAGNOSIS — F90.9 ATTENTION-DEFICIT HYPERACTIVITY DISORDER, UNSPECIFIED TYPE: ICD-10-CM

## 2024-02-09 DIAGNOSIS — F12.20 CANNABIS DEPENDENCE, UNCOMPLICATED: ICD-10-CM

## 2024-02-09 DIAGNOSIS — F41.1 GENERALIZED ANXIETY DISORDER: ICD-10-CM

## 2024-02-09 DIAGNOSIS — F41.8 OTHER SPECIFIED ANXIETY DISORDERS: ICD-10-CM

## 2024-02-09 DIAGNOSIS — F41.0 PANIC DISORDER [EPISODIC PAROXYSMAL ANXIETY]: ICD-10-CM

## 2024-02-09 PROCEDURE — 99214 OFFICE O/P EST MOD 30 MIN: CPT | Mod: 95

## 2024-02-09 PROCEDURE — ZZZZZ: CPT

## 2024-02-09 RX ORDER — BUPROPION HYDROCHLORIDE 150 MG/1
150 TABLET, EXTENDED RELEASE ORAL DAILY
Qty: 30 | Refills: 0 | Status: DISCONTINUED | COMMUNITY
Start: 2021-08-13 | End: 2024-02-09

## 2024-02-09 NOTE — REASON FOR VISIT
[Patient] : Patient [Follow-Up Visit] : a follow-up visit [Patient preference] : as per patient preference [Starting, patient seen in-person within last 6 months] : Telehealth services are being started as patient has seen in-person within last 6 months. [Telehealth (audio & video) - Individual/Group] : This visit was provided via telehealth using real-time 2-way audio visual technology. [Medical Office: ([facility])___] : The provider was located at the medical office in [unfilled]. [Home] : The patient, [unfilled], was located at home, [unfilled], at the time of the visit. [Verbal consent obtained from patient/other participant(s)] : Verbal consent for telehealth/telephonic services obtained from patient/other participant(s) [FreeTextEntry4] : 10:36 am [FreeTextEntry5] : 11:10 am [FreeTextEntry2] : 1/12/24 [FreeTextEntry1] : med management

## 2024-02-09 NOTE — HISTORY OF PRESENT ILLNESS
[No] : no [Responsibility to family or others] : responsibility to family or others [Identifies reasons for living] : identifies reasons for living [Future oriented] : future oriented [Engaged in work or school] : engaged in work or school [Supportive social network or family] : supportive social network or family [Positive therapeutic relationships] : positive therapeutic relationships [Ability to cope with stress] : ability to cope with stress [None Known] : none known [Substance abuse] : substance abuse [Residential stability] : residential stability [Relationship stability] : relationship stability [Engagement in treatment] : engagement in treatment [Affective stability] : affective stability [Good treatment response/ compliance] : good treatment response/ compliance [FreeTextEntry3] : \par   [de-identified] : Pt seen via [employer] for total of 30 minutes.   Reports a rough time visiting her mother as mother is still very ill as her new hepatic cancer (with mets to lungs) medication, Zejula, is not working (mother was diagnosed in 2019). Reports mother is considering returning to [facility] for euthanasia. Reports her father is completely opposed; pt and her sister are doing research on it. Reports that she is getting support from her partner and friends. Expresses interest in connecting with a therapist at our clinic.   Reports anxiety has been worse; 3 panic attacks this week. On further questioning, pt decreased her prozac to 40 mg about 2 weeks ago. Denies palpitations. She rescheduled her ekg from today to Monday, 2/12. States she still uses cannabis (1 gummy at night and "1 hit" of a joint once a week); extensive psychoeducation provided on risks of cannabis use, impact on anxiety and focus, as well as risk of delirium or psychosis when mixed with a stimulant. Pt reports that she will consider quitting, agreeable to try clonidine to assist with this. She reports a total of 7h of sleep at night and about 2h of naps. Reports adequate appetite. She denies AE, including chest pain, Sx of julisa, agitation, psychosis, serotonin syndrome. Denies SI, HI, NSSIB.  [TextBox_32] : Low chronic and acute risk. Patient's risk factors include history of substance use and psychiatric history, mitigated by engagement in job, family support, high motivation, and compliance with medications/treatment.

## 2024-02-09 NOTE — ASSESSMENT
[FreeTextEntry1] : 32 y/o single [place] American woman, with two Masters in lit/writing, started PhD program in fall of 2020 (currently active in [place]), domiciled with her partner, with no significant PMH and PPH of depression, anxiety, panic attacks and polysubstance misuse (sedative / hypnotic and stimulant), in remission w/ recent slip in 7/2021 and 3/2023, cannabis user (simran), treated as an outpatient since adolescence, who was referred to [facility] OPD years ago after presenting to the ED for a medication refill of her Wellbutrin and Lexapro in the context of increased stress. Presenting for f/u for med management.   Extensive Hx and evaluation performed for ADHD dx. Pt sufficiently meets criteria for adult ADHD, and was started on vyvanse; pt has never overused vyvanse or adderall, or other substances, though she acknowledges that vyvanse and adderall were illicitly obtained to self-medicate in the past.   Reports palpitations have stopped, but endorses anxiety after decreasing her prozac further. Pt reminded to obtain EKG (referral given). She denies other AE, including chest pain, Sx of julisa, agitation, psychosis, serotonin syndrome, or increased anxiety. Pt also provided extensive psychoed on risks of using depressants, such as marijuana or alcohol, on vyvanse, as well as depressants' effects on mood and anxiety. Pt agreeable to try to decrease and eventually stop marijuana; RAB discussed, pt agrees to try clonidine to help marijuana cessation.   Plan: - continue vyvanse 30 mg daily - obtain EKG - obtain BP monitor for regular at home monitoring of BP and HR - increase Prozac back to 60 mg PO qdaily, for OPAL / Panic Disorder (pt self-tapered to 40 mg and anxiety worsened) - start clonidine 0.1 mg po qhs for marijuana cessation; RAB discussed, pt counseled to monitor blood pressures at home. Pt conveyed understanding.  - rtc in 4 weeks  Total time with pt: 30 min
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
## 2024-02-09 NOTE — CURRENT PSYCHIATRIC SYMPTOMS
[Decreased Concentration] : decreased concentrating ability [Psychomotor Agitation] : psychomotor agitation [Increased Activity] : increased activity [Distractibility] : distractibility [Talkativeness] : talkativeness [Ruminations] : ruminations [Excessive Worry] : excessive worry [Panic] : panic  [Depressed Mood] : no depressed mood [Anhedonia] : no anhedonia [Guilt] : not feeling guilty [Hyperphagia] : no hyperphagia [Hypersomnia] : no ~T hypersomnia [Insomnia] : no insomnia disorder [Psychomotor Retardation] : no psychomotor retardation [Euphoria] : no euphoria [Highly Irritable] : no high irritability [Dec Need For Sleep] : no decreased need for sleep [Delusions] : no ~T delusions [Hallucination Visual] : no visual hallucinations [Hallucination Auditory] : no auditory hallucinations [Restlessness] : no restlessness [Recent Onset] : no recent onset of confusion

## 2024-02-09 NOTE — PHYSICAL EXAM
[None] : none [Feeling Restless] : feeling ~L restless [Anxious] : anxious [Appropriate] : appropriate [Oriented] : oriented [Normal] : good [Tics] : no tics [Tremor] : ~T no ~M tremor was seen [Rigidity] : no rigidity [Dystonia] : no dystonia was noted [Inc Latency] : no increased latency [Pressured] : not pressured [Slowed] : not slowed [Soft] : not soft [Loud] : not loud [Talkative] : not talkative [Paucity] : no paucity [Mutism] : no mutism [Dysarthria] : no dysarthria [Stuttering] : no stuttering [Perseveration] : no perseveration [Rapid] : not rapid [Delayed] : not delayed [Illogical tangential] : no logical tangential [Loose Circumstantial] : no loose circumstantial [Impoverished] : content/associations not impoverished [Impaired] : abstract reasoning not impaired [AH] : no auditory hallucinations [VH] : no visual hallucinations [Suicidal Ideation] : no suicidal ideation [Homicidal Ideation] : no homicidal ideation [Afraid] : not afraid [Irritable] : no irritable [Dysphoric] : not dysphoric [Constricted] : not constricted [Labile] : not labile [Lethargic] : no lethargic [Unarousable] : not unarousable [FreeTextEntry5] : animated, less so today [FreeTextEntry8] : "anxious" [de-identified] : improved
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
## 2024-02-10 DIAGNOSIS — F41.0 PANIC DISORDER [EPISODIC PAROXYSMAL ANXIETY]: ICD-10-CM

## 2024-02-10 DIAGNOSIS — F12.20 CANNABIS DEPENDENCE, UNCOMPLICATED: ICD-10-CM

## 2024-02-10 DIAGNOSIS — F41.1 GENERALIZED ANXIETY DISORDER: ICD-10-CM

## 2024-02-10 DIAGNOSIS — F41.8 OTHER SPECIFIED ANXIETY DISORDERS: ICD-10-CM

## 2024-02-10 DIAGNOSIS — F90.9 ATTENTION-DEFICIT HYPERACTIVITY DISORDER, UNSPECIFIED TYPE: ICD-10-CM

## 2024-02-12 ENCOUNTER — APPOINTMENT (OUTPATIENT)
Dept: CARDIOLOGY | Facility: CLINIC | Age: 34
End: 2024-02-12
Payer: COMMERCIAL

## 2024-02-12 VITALS
HEIGHT: 68 IN | BODY MASS INDEX: 22.73 KG/M2 | DIASTOLIC BLOOD PRESSURE: 70 MMHG | SYSTOLIC BLOOD PRESSURE: 110 MMHG | WEIGHT: 150 LBS | HEART RATE: 77 BPM

## 2024-02-12 DIAGNOSIS — R00.2 PALPITATIONS: ICD-10-CM

## 2024-02-12 PROCEDURE — 99203 OFFICE O/P NEW LOW 30 MIN: CPT | Mod: 25

## 2024-02-12 PROCEDURE — 93000 ELECTROCARDIOGRAM COMPLETE: CPT

## 2024-02-12 NOTE — REASON FOR VISIT
[FreeTextEntry1] : Ms. BERRY PENNY is a 33 year old woman with PMHx of general anxiety disorder who is presenting for evaluation of palpitations. She was recently started on vyvanse and was recommended to get an ECG. She is not overly bothered by her palpitations. They do not occur with exertion. She has no chest pain, SOB or limitations with exertion. She cannot tell if her palpitations are associated with anxiety.  She has no significant FHx of CVD.

## 2024-02-12 NOTE — ASSESSMENT
[FreeTextEntry1] : Ms. BERRY PENNY is a 33 year old woman with PMHx of general anxiety disorder who is presenting for evaluation of palpitations.  -Reviewed ECG, which is normal -Defer work up of palpitations as they are not concerning for patient -If palpitations worsen, would perform TTE and MCOT  Time in encounter, including face to face visit and time reviewing chart:  40 minutes  Rajendra Cornejo MD, FACC Non-Invasive Cardiology [address], [address] Office: [phone] ambulatory n/a

## 2024-03-08 ENCOUNTER — OUTPATIENT (OUTPATIENT)
Dept: OUTPATIENT SERVICES | Facility: HOSPITAL | Age: 34
LOS: 1 days | End: 2024-03-08
Payer: COMMERCIAL

## 2024-03-08 ENCOUNTER — APPOINTMENT (OUTPATIENT)
Dept: PSYCHIATRY | Facility: CLINIC | Age: 34
End: 2024-03-08
Payer: COMMERCIAL

## 2024-03-08 DIAGNOSIS — F41.1 GENERALIZED ANXIETY DISORDER: ICD-10-CM

## 2024-03-08 DIAGNOSIS — F41.8 OTHER SPECIFIED ANXIETY DISORDERS: ICD-10-CM

## 2024-03-08 DIAGNOSIS — F90.9 ATTENTION-DEFICIT HYPERACTIVITY DISORDER, UNSPECIFIED TYPE: ICD-10-CM

## 2024-03-08 DIAGNOSIS — F41.0 PANIC DISORDER [EPISODIC PAROXYSMAL ANXIETY]: ICD-10-CM

## 2024-03-08 PROCEDURE — ZZZZZ: CPT

## 2024-03-08 PROCEDURE — 99213 OFFICE O/P EST LOW 20 MIN: CPT | Mod: 95

## 2024-03-08 NOTE — CURRENT PSYCHIATRIC SYMPTOMS
[Decreased Concentration] : decreased concentrating ability [Increased Activity] : increased activity [Distractibility] : distractibility [Talkativeness] : talkativeness [Ruminations] : ruminations [Depressed Mood] : no depressed mood [Anhedonia] : no anhedonia [Guilt] : not feeling guilty [Hyperphagia] : no hyperphagia [Insomnia] : no insomnia disorder [Hypersomnia] : no ~T hypersomnia [Psychomotor Agitation] : no agitation [Psychomotor Retardation] : no psychomotor retardation [Euphoria] : no euphoria [Highly Irritable] : no high irritability [Dec Need For Sleep] : no decreased need for sleep [Delusions] : no ~T delusions [Hallucination Visual] : no visual hallucinations [Hallucination Auditory] : no auditory hallucinations [Excessive Worry] : no excessive worries [Restlessness] : no restlessness [Panic] : no panic disorder [Recent Onset] : no recent onset of confusion

## 2024-03-08 NOTE — ASSESSMENT
[FreeTextEntry1] : 32 y/o single [place] American woman, with two Masters in lit/writing, started PhD program in fall of 2020 (currently active in [place]), domiciled with her partner, with no significant PMH and PPH of depression, anxiety, panic attacks and polysubstance misuse (sedative / hypnotic and stimulant), in remission w/ recent slip in 7/2021 and 3/2023, cannabis user (simran), treated as an outpatient since adolescence, who was referred to [facility] OPD years ago after presenting to the ED for a medication refill of her Wellbutrin and Lexapro in the context of increased stress. Presenting for f/u for med management.   Extensive Hx and evaluation performed for ADHD dx. Pt sufficiently meets criteria for adult ADHD, and was started on vyvanse; pt has never overused vyvanse or adderall, or other substances, though she acknowledges that vyvanse and adderall were illicitly obtained to self-medicate in the past.   Reports palpitations have stopped, EKG was WNL and pt was evaluated by a cardiologist (no issues were reported by cardiologist). She decreased her prozac back to 40 mg after social stressors improved and has tolerated the decrease well. Pt also provided extensive psychoed on risks of using depressants, such as marijuana or alcohol, on vyvanse, as well as depressants' effects on mood and anxiety. Pt has decreased use to approx 2/week and plans to eventually stop marijuana; RAB discussed, pt agrees to try clonidine to help marijuana cessation, especially if she develops problems sleeping during increase of vyvanse, as opposed to self-medicating with THC. Informed of transition of care to a different physician at end of June 2024.  Plan: - increase vyvanse 30 mg to 40 mg daily - EKG WNL - pt continues to regular monitor BP and HR; reports last values were WNL - continue Prozac 40 mg PO qdaily, for OPAL / Panic Disorder (pt self-tapered back down to 40 mg) - can start clonidine 0.1 mg po qhs for marijuana cessation; RAB discussed, pt counseled to monitor blood pressures at home. Pt has not picked up yet as she has decreased her usage independently. - rtc in 4 weeks  Total time with pt: 20 min

## 2024-03-08 NOTE — HISTORY OF PRESENT ILLNESS
[No] : no [Responsibility to family or others] : responsibility to family or others [Identifies reasons for living] : identifies reasons for living [Future oriented] : future oriented [Engaged in work or school] : engaged in work or school [Supportive social network or family] : supportive social network or family [Positive therapeutic relationships] : positive therapeutic relationships [Ability to cope with stress] : ability to cope with stress [None Known] : none known [Substance abuse] : substance abuse [Residential stability] : residential stability [Relationship stability] : relationship stability [Engagement in treatment] : engagement in treatment [Affective stability] : affective stability [Good treatment response/ compliance] : good treatment response/ compliance [FreeTextEntry3] : \par   [de-identified] : [facility]   Reports social stressors improved. She continued prozac 40 mg daily. Reports anxiety is manageable and denies depressed mood. Denies palpitations. EKG was wnl and denies palpitations on vyvanse. Continues to monitor vitals at home. Pt's concentration improved on vyvanse 30 mg initially, but it has plateaued after several weeks. We will increase to 40 mg- RAB discussed thoroughly. States she still uses cannabis (1 gummy at night and "1 hit" of a joint once a week); extensive psychoeducation provided on risks of cannabis use, impact on anxiety and focus, as well as risk of delirium or psychosis when mixed with a stimulant. Pt reports that she will consider quitting, agreeable to try clonidine to assist with this. She reports a total of 7h of sleep at night and about 2h of naps. Reports adequate appetite. She denies AE, including chest pain, Sx of julisa, agitation, psychosis, serotonin syndrome. Denies SI, HI, NSSIB.  [TextBox_32] : Low chronic and acute risk. Patient's risk factors include history of substance use and psychiatric history, mitigated by engagement in job, family support, high motivation, and compliance with medications/treatment.

## 2024-03-08 NOTE — PHYSICAL EXAM
[None] : none [Feeling Restless] : feeling ~L restless [Anxious] : anxious [Appropriate] : appropriate [Oriented] : oriented [Normal] : good [Tics] : no tics [Tremor] : ~T no ~M tremor was seen [Rigidity] : no rigidity [Dystonia] : no dystonia was noted [Inc Latency] : no increased latency [Pressured] : not pressured [Slowed] : not slowed [Soft] : not soft [Loud] : not loud [Talkative] : not talkative [Paucity] : no paucity [Mutism] : no mutism [Dysarthria] : no dysarthria [Stuttering] : no stuttering [Perseveration] : no perseveration [Rapid] : not rapid [Delayed] : not delayed [Illogical tangential] : no logical tangential [Loose Circumstantial] : no loose circumstantial [Impoverished] : content/associations not impoverished [Impaired] : abstract reasoning not impaired [AH] : no auditory hallucinations [VH] : no visual hallucinations [Suicidal Ideation] : no suicidal ideation [Homicidal Ideation] : no homicidal ideation [Afraid] : not afraid [Irritable] : no irritable [Dysphoric] : not dysphoric [Constricted] : not constricted [Labile] : not labile [Lethargic] : no lethargic [Unarousable] : not unarousable [FreeTextEntry5] : animated, less so today [de-identified] : improved on vyvanse, then plateaued on current dose

## 2024-03-08 NOTE — REASON FOR VISIT
[Patient preference] : as per patient preference [Starting, patient seen in-person within last 6 months] : Telehealth services are being started as patient has seen in-person within last 6 months. [Telehealth (audio & video) - Individual/Group] : This visit was provided via telehealth using real-time 2-way audio visual technology. [Medical Office: ([facility])___] : The provider was located at the medical office in [unfilled]. [Home] : The patient, [unfilled], was located at home, [unfilled], at the time of the visit. [Verbal consent obtained from patient/other participant(s)] : Verbal consent for telehealth/telephonic services obtained from patient/other participant(s) [Patient] : Patient [Follow-Up Visit] : a follow-up visit [FreeTextEntry4] : 11:31 am [FreeTextEntry5] : 12:00 pm  [FreeTextEntry2] : 1/12/24 [FreeTextEntry1] : med management

## 2024-03-09 DIAGNOSIS — F41.0 PANIC DISORDER [EPISODIC PAROXYSMAL ANXIETY]: ICD-10-CM

## 2024-03-09 DIAGNOSIS — F41.8 OTHER SPECIFIED ANXIETY DISORDERS: ICD-10-CM

## 2024-03-09 DIAGNOSIS — F90.9 ATTENTION-DEFICIT HYPERACTIVITY DISORDER, UNSPECIFIED TYPE: ICD-10-CM

## 2024-03-09 DIAGNOSIS — F41.1 GENERALIZED ANXIETY DISORDER: ICD-10-CM

## 2024-03-18 ENCOUNTER — OUTPATIENT (OUTPATIENT)
Dept: OUTPATIENT SERVICES | Facility: HOSPITAL | Age: 34
LOS: 1 days | End: 2024-03-18
Payer: COMMERCIAL

## 2024-03-18 ENCOUNTER — APPOINTMENT (OUTPATIENT)
Dept: PSYCHIATRY | Facility: CLINIC | Age: 34
End: 2024-03-18

## 2024-03-18 DIAGNOSIS — F90.9 ATTENTION-DEFICIT HYPERACTIVITY DISORDER, UNSPECIFIED TYPE: ICD-10-CM

## 2024-03-18 PROCEDURE — 90832 PSYTX W PT 30 MINUTES: CPT

## 2024-03-18 NOTE — PHYSICAL EXAM
[Average] : average [Cooperative] : cooperative [Full] : full [Euthymic] : euthymic [Clear] : clear [Linear/Goal Directed] : linear/goal directed [Above average] : above average [WNL] : within normal limits

## 2024-03-18 NOTE — PLAN
[FreeTextEntry2] : TBD [Supportive Therapy] : Supportive Therapy [de-identified] : Bri and I met for initial psychotherapy session today from 1:30pm to 2:00pm in-person. We focused on building rapport, preliminary goal setting, and some stressors that have impacted her life recently. Bri stated that she has historically had difficulty coping with stressors, particularly when feeling overwhelmed with the demands of life. She has had trouble accepting and navigating her mother's medical issues for over 20 years, has had substance use issues in the past, and has a extensive treatment history with psychotropic medication and psychotherapy. She would like to gain more coping skills to manage distress, learn new ways to view mental illness, and be more accepting of certain aspects of life. I incorporated a person-centered approach during the session to facilitate empathy, genuineness, and positive regard. Bri was cooperative and pleasant during the session and mentioned that she enjoys working on herself. We agreed to work on "homework" in therapy to reinforce ideas or concepts that come up in the future. 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 She is working on her dissertation which pertains to her mother's illness. She was unsure if this was a good idea as it can be triggering for her, but we also explored the possibility of this leading to growth and resiliency through facing adversity. We will meet back in two weeks to complete treatment planning, screenings, and engage in topics of interest. I will connect homework assignments to the session content to create more pathways for discussion going forward. She denies any safety concerns and did not endorse any intrusive side effects from medication.  [Recommended Frequency of Visits: ____] : Recommended frequency of visits: [unfilled] [FreeTextEntry1] : Continue taking medications as prescribed. Meet in 2 weeks for session. Complete tx planning and screenings.  [Return in ____ week(s)] : Return in [unfilled] week(s)

## 2024-03-18 NOTE — RISK ASSESSMENT
[No, patient denies ideation or behavior] : No, patient denies ideation or behavior [Not clinically indicated] : Safety Plan completed/updated (for individuals at risk): Not clinically indicated [Low acute suicide risk] : Low acute suicide risk [No] : No

## 2024-03-18 NOTE — END OF VISIT
[Individual Psychotherapy for 16-37 minutes] : Individual Psychotherapy for 16-37 minutes [Duration of Psychotherapy Visit (minutes spent in synchronous communication): ____] : Duration of Psychotherapy Visit (minutes spent in synchronous communication): [unfilled] [FreeTextEntry1] : Moe Sanz LMSW [Licensed Clinician] : Licensed Clinician

## 2024-03-19 DIAGNOSIS — F90.9 ATTENTION-DEFICIT HYPERACTIVITY DISORDER, UNSPECIFIED TYPE: ICD-10-CM

## 2024-04-01 ENCOUNTER — OUTPATIENT (OUTPATIENT)
Dept: OUTPATIENT SERVICES | Facility: HOSPITAL | Age: 34
LOS: 1 days | End: 2024-04-01
Payer: COMMERCIAL

## 2024-04-01 ENCOUNTER — APPOINTMENT (OUTPATIENT)
Dept: PSYCHIATRY | Facility: CLINIC | Age: 34
End: 2024-04-01

## 2024-04-01 DIAGNOSIS — F90.9 ATTENTION-DEFICIT HYPERACTIVITY DISORDER, UNSPECIFIED TYPE: ICD-10-CM

## 2024-04-01 PROCEDURE — 90832 PSYTX W PT 30 MINUTES: CPT

## 2024-04-01 NOTE — PHYSICAL EXAM
[Average] : average [Cooperative] : cooperative [Euthymic] : euthymic [Full] : full [Clear] : clear [Linear/Goal Directed] : linear/goal directed [Preoccupations/Ruminations] : preoccupations/ruminations [Above average] : above average [WNL] : within normal limits [FreeTextEntry7] : Some rumination over medication and if it is necessary.

## 2024-04-01 NOTE — END OF VISIT
[Duration of Psychotherapy Visit (minutes spent in synchronous communication): ____] : Duration of Psychotherapy Visit (minutes spent in synchronous communication): [unfilled] [Individual Psychotherapy for 16-37 minutes] : Individual Psychotherapy for 16-37 minutes [Licensed Clinician] : Licensed Clinician [FreeTextEntry1] : Moe Sanz LMSW

## 2024-04-01 NOTE — PLAN
[FreeTextEntry2] : TBD [Cognitive and/or Behavior Therapy] : Cognitive and/or Behavior Therapy  [Psychoeducation] : Psychoeducation  [Supportive Therapy] : Supportive Therapy [Recommended Frequency of Visits: ____] : Recommended frequency of visits: [unfilled] [de-identified] : Bri and I met for psychotherapy session today in-person from 3:00pm to 3:30pm. We continued to build rapport through discussion of medication, independence, substances, and talks of anxiety. We focused on some mental health screenings including the OPAL-7 and PHQ-9, along with treatment planning. Bri decided to focus on her Vyvanse medication, the nature of medication, mental health conditions, and whether or not she needs them. She stated that her mother has been sick her whole life and has needed to rely on medication, while her father was always anti-medication. This has led to her own conflicted feelings on the matter. She spoke on taking on her mother's illness and feeling sick herself her whole life, which has led her to wanting to function on her own without the use of any substances. She recognized the extreme or absolute thoughts of having to be accountable without any help, while also having moments of feeling helpless based on the context. I utilized person-centered therapy to empathize with her feelings as well as cognitive behavioral therapy to treat the anxious thoughts and beliefs that surrounded the discussion. Alva verbalized an understanding of her anxious thoughts about medication and became more aware of the different contexts. She recognized that it is ok to rely on something that will be helpful for her, while also noting contexts in which substances may not be relevant, such as the use of marijuana or caffeine. She expressed interest in DBT as well, and became more familiar with the dialectical balance of that treatment. She is beginning to apply these principles to her own life to erase the black and white thinking. She is adopting more of a balanced style in which she can be independent and less reliant on external help, while also being accepting of external help in the form of ADHD medication to feel better. She is also progressing more towards acceptance of her mother's illness and how she does not need to shoulder the burden along with her. The screenings indicated mild anxiety as well. She reports good outcomes from Vyvanse, and is now able to manage her tasks more efficiently. I gave her a thought record to practice CBT restructuring whenever anxiety is present. We may also review core beliefs for REBT and delve into DBT skills training as well. There are no safety concerns at this time. We will meet in a few weeks for follow-up session.  [Return in ____ week(s)] : Return in [unfilled] week(s) [FreeTextEntry1] : Practice restructuring thoughts with thought record. Continue medication plan. Consider REBT and DBT methods going forward.

## 2024-04-01 NOTE — PLAN
[FreeTextEntry2] : TBD [Cognitive and/or Behavior Therapy] : Cognitive and/or Behavior Therapy  [Psychoeducation] : Psychoeducation  [Supportive Therapy] : Supportive Therapy [de-identified] : Bri and I met for psychotherapy session today in-person from 3:00pm to 3:30pm. We continued to build rapport through discussion of medication, independence, substances, and talks of anxiety. We focused on some mental health screenings including the OPAL-7 and PHQ-9, along with treatment planning. Bri decided to focus on her Vyvanse medication, the nature of medication, mental health conditions, and whether or not she needs them. She stated that her mother has been sick her whole life and has needed to rely on medication, while her father was always anti-medication. This has led to her own conflicted feelings on the matter. She spoke on taking on her mother's illness and feeling sick herself her whole life, which has led her to wanting to function on her own without the use of any substances. She recognized the extreme or absolute thoughts of having to be accountable without any help, while also having moments of feeling helpless based on the context. I utilized person-centered therapy to empathize with her feelings as well as cognitive behavioral therapy to treat the anxious thoughts and beliefs that surrounded the discussion. Alva verbalized an understanding of her anxious thoughts about medication and became more aware of the different contexts. She recognized that it is ok to rely on something that will be helpful for her, while also noting contexts in which substances may not be relevant, such as the use of marijuana or caffeine. She expressed interest in DBT as well, and became more familiar with the dialectical balance of that treatment. She is beginning to apply these principles to her own life to erase the black and white thinking. She is adopting more of a balanced style in which she can be independent and less reliant on external help, while also being accepting of external help in the form of ADHD medication to feel better. She is also progressing more towards acceptance of her mother's illness and how she does not need to shoulder the burden along with her. The screenings indicated mild anxiety as well. She reports good outcomes from Vyvanse, and is now able to manage her tasks more efficiently. I gave her a thought record to practice CBT restructuring whenever anxiety is present. We may also review core beliefs for REBT and delve into DBT skills training as well. There are no safety concerns at this time. We will meet in a few weeks for follow-up session.  [Recommended Frequency of Visits: ____] : Recommended frequency of visits: [unfilled] [Return in ____ week(s)] : Return in [unfilled] week(s) [FreeTextEntry1] : Practice restructuring thoughts with thought record. Continue medication plan. Consider REBT and DBT methods going forward.

## 2024-04-02 DIAGNOSIS — F90.9 ATTENTION-DEFICIT HYPERACTIVITY DISORDER, UNSPECIFIED TYPE: ICD-10-CM

## 2024-04-03 NOTE — DISCUSSION/SUMMARY
[Plan Review] : Plan Review [Able to manage surrounding demands and opportunities] : able to manage surrounding demands and opportunities [Adherent to treatment recommendations] : adherent to treatment recommendations [Able to set and pursue goals] : able to set and pursue goals [Able to exercise self-direction] : able to exercise self-direction [Articulate] : articulate [Attempting to realize their potential] : Attempting to realize their potential [Awareness of substance use issues] : awareness of substance use issues [Cognitively intact] : cognitively intact [Creative] : creative [Insightful] : insightful [Intelligent] : intelligent [Motivated and ready for change] : motivated and ready for change [Motivated to participate in treatment] : motivated to participate in treatment [Health literacy] : health literacy [Motivated to maintain or improve physical health] : motivated to maintain or improve physical health [Has vocational interests or hobbies] : has vocational interests or hobbies [In good health] : in good health [Part of a supportive family] : part of a supportive family [Housing stability] : housing stability [Steady employment] : steady employment [High level of education] : high level of education [English fluency] : English fluency [Access to safe outdoor spaces] : access to safe outdoor spaces [Connected to healthcare] : connected to healthcare [Social supports] : social supports [FreeTextEntry2] : 4/1/2025 [FreeTextEntry3] : 12/22/2017 [FreeTextEntry7] : The patient has a plethora of strengths and supports that will be advantageous in treatment.  [FreeTextEntry8] : Patient has history of substance abuse and identification with being sick. Patient is working on moving forward from this.  [FreeTextEntry9] : Patient considers herself to be spiritual which may have implications for treatment.  [de-identified] : None [Mental Health] : Mental Health [Physical Health] : Physical Health [Initial] : Initial [every ___ weeks] : every [unfilled] weeks [Improvement in symptoms as evidenced by:] : Improvement in symptoms as evidenced by: [A change in level of care is needed as evidenced by:] : A change in level of care is needed as evidenced by: [Treatment is no longer medically necessary as evidenced by:] : Treatment is no longer medically necessary as evidenced by: [Attainment of higher level of functioning as evidenced by:] : Attainment of higher level of functioning as evidenced by: [None - Reason others did not participate:] : None - Reason others did not participate:  [Psychiatric Provider/Prescriber] : Psychiatric Provider/Prescriber [Yes] : Yes [Therapist] : Therapist [Supervisor (if needed)] : Supervisor [FreeTextEntry4] : "To attain optimal physical health." (Long-term Prioritized)  [FreeTextEntry1] : Patient is cognizant of physical health and would like to ensure she is maintaining this life area.  [de-identified] : Patient understands the connection between physical and psychological health. [de-identified] : Patient shall attend all medical appointments over the review period. [de-identified] : 4/1/2025 [de-identified] : Patient can benefit from managing all medical appointments as a means to be proactive.  [de-identified] : Patient shall apply at least 3 new lifestyle changes to increase physical health over the review period. [de-identified] : 4/1/2025 [FreeTextEntry5] :  shall encourage patient to attend all appointments and provide psychoeducation on the overlap between physical and mental health. We will cover strategies and lifestyle including exercise, diet, mindfulness, and other forms of meditation.  [de-identified] : Patient can benefit from consistency with healthy activities that are conducive to desirable physical health. [de-identified] : Dr. Reis/Tammy - In-person [de-identified] : Moe Sanz, ROSA ISELA - In-person [de-identified] : Less anxiety and confidence in self. [de-identified] : Implementation of effective coping skills, healthy self-concept, and clear thinking. [de-identified] : Attainment of all treatment goals.  [de-identified] : Improvement or decompensation of mental health. [de-identified] : Not clinically indicated.

## 2024-04-03 NOTE — DISCUSSION/SUMMARY
[Plan Review] : Plan Review [Able to manage surrounding demands and opportunities] : able to manage surrounding demands and opportunities [Able to exercise self-direction] : able to exercise self-direction [Adherent to treatment recommendations] : adherent to treatment recommendations [Able to set and pursue goals] : able to set and pursue goals [Articulate] : articulate [Attempting to realize their potential] : Attempting to realize their potential [Cognitively intact] : cognitively intact [Awareness of substance use issues] : awareness of substance use issues [Creative] : creative [Insightful] : insightful [Intelligent] : intelligent [Motivated and ready for change] : motivated and ready for change [Motivated to participate in treatment] : motivated to participate in treatment [Health literacy] : health literacy [Motivated to maintain or improve physical health] : motivated to maintain or improve physical health [Has vocational interests or hobbies] : has vocational interests or hobbies [In good health] : in good health [Steady employment] : steady employment [Housing stability] : housing stability [Part of a supportive family] : part of a supportive family [English fluency] : English fluency [High level of education] : high level of education [Connected to healthcare] : connected to healthcare [Access to safe outdoor spaces] : access to safe outdoor spaces [Social supports] : social supports [FreeTextEntry2] : 4/1/2025 [FreeTextEntry3] : 12/22/2017 [FreeTextEntry7] : The patient has a plethora of strengths and supports that will be advantageous in treatment.  [FreeTextEntry8] : Patient has history of substance abuse and identification with being sick. Patient is working on moving forward from this.  [FreeTextEntry9] : Patient considers herself to be spiritual which may have implications for treatment.  [de-identified] : None [Mental Health] : Mental Health [Physical Health] : Physical Health [Initial] : Initial [every ___ weeks] : every [unfilled] weeks [Improvement in symptoms as evidenced by:] : Improvement in symptoms as evidenced by: [Treatment is no longer medically necessary as evidenced by:] : Treatment is no longer medically necessary as evidenced by: [A change in level of care is needed as evidenced by:] : A change in level of care is needed as evidenced by: [Attainment of higher level of functioning as evidenced by:] : Attainment of higher level of functioning as evidenced by: [None - Reason others did not participate:] : None - Reason others did not participate:  [Psychiatric Provider/Prescriber] : Psychiatric Provider/Prescriber [Yes] : Yes [Supervisor (if needed)] : Supervisor [Therapist] : Therapist [FreeTextEntry4] : "To attain optimal physical health." (Long-term Prioritized)  [FreeTextEntry1] : Patient is cognizant of physical health and would like to ensure she is maintaining this life area.  [de-identified] : Patient understands the connection between physical and psychological health. [de-identified] : Patient shall attend all medical appointments over the review period. [de-identified] : Patient can benefit from managing all medical appointments as a means to be proactive.  [de-identified] : 4/1/2025 [de-identified] : Patient shall apply at least 3 new lifestyle changes to increase physical health over the review period. [de-identified] : 4/1/2025 [de-identified] : Patient can benefit from consistency with healthy activities that are conducive to desirable physical health. [de-identified] : Dr. Reis/Tammy - In-person [FreeTextEntry5] :  shall encourage patient to attend all appointments and provide psychoeducation on the overlap between physical and mental health. We will cover strategies and lifestyle including exercise, diet, mindfulness, and other forms of meditation.  [de-identified] : Less anxiety and confidence in self. [de-identified] : Moe Sanz, ROSA ISELA - In-person [de-identified] : Attainment of all treatment goals.  [de-identified] : Implementation of effective coping skills, healthy self-concept, and clear thinking. [de-identified] : Improvement or decompensation of mental health. [de-identified] : Not clinically indicated.

## 2024-04-05 ENCOUNTER — OUTPATIENT (OUTPATIENT)
Dept: OUTPATIENT SERVICES | Facility: HOSPITAL | Age: 34
LOS: 1 days | End: 2024-04-05
Payer: COMMERCIAL

## 2024-04-05 ENCOUNTER — APPOINTMENT (OUTPATIENT)
Dept: PSYCHIATRY | Facility: CLINIC | Age: 34
End: 2024-04-05
Payer: COMMERCIAL

## 2024-04-05 DIAGNOSIS — F90.9 ATTENTION-DEFICIT HYPERACTIVITY DISORDER, UNSPECIFIED TYPE: ICD-10-CM

## 2024-04-05 DIAGNOSIS — F41.8 OTHER SPECIFIED ANXIETY DISORDERS: ICD-10-CM

## 2024-04-05 DIAGNOSIS — F41.0 PANIC DISORDER [EPISODIC PAROXYSMAL ANXIETY]: ICD-10-CM

## 2024-04-05 DIAGNOSIS — F41.1 GENERALIZED ANXIETY DISORDER: ICD-10-CM

## 2024-04-05 PROCEDURE — 99213 OFFICE O/P EST LOW 20 MIN: CPT | Mod: 95

## 2024-04-05 PROCEDURE — ZZZZZ: CPT

## 2024-04-05 NOTE — CURRENT PSYCHIATRIC SYMPTOMS
[Decreased Concentration] : decreased concentrating ability [Increased Activity] : increased activity [Distractibility] : distractibility [Talkativeness] : talkativeness [Ruminations] : ruminations [Depressed Mood] : no depressed mood [Anhedonia] : no anhedonia [Guilt] : not feeling guilty [Hyperphagia] : no hyperphagia [Insomnia] : no insomnia disorder [Hypersomnia] : no ~T hypersomnia [Psychomotor Retardation] : no psychomotor retardation [Euphoria] : no euphoria [Highly Irritable] : no high irritability [Dec Need For Sleep] : no decreased need for sleep [Delusions] : no ~T delusions [Hallucination Visual] : no visual hallucinations [Hallucination Auditory] : no auditory hallucinations [Excessive Worry] : no excessive worries [Restlessness] : no restlessness [Panic] : no panic disorder [Recent Onset] : no recent onset of confusion

## 2024-04-05 NOTE — ASSESSMENT
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 Presenting for f/u for med management.   Extensive Hx and evaluation performed for ADHD dx. Pt sufficiently meets criteria for adult ADHD, and was started on vyvanse; pt has never overused vyvanse or adderall, or other substances, though she acknowledges that vyvanse and adderall were illicitly obtained to self-medicate in the past.   Reports palpitations have stopped, EKG was WNL and pt was evaluated by a cardiologist (no issues were reported by cardiologist). She continues to take prozac 40 mg after social stressors improved. Pt also provided extensive psychoed on risks of using depressants, such as marijuana or alcohol, on vyvanse, as well as depressants' effects on mood and anxiety. Pt has decreased use to approx 2/week and plans to eventually stop marijuana; RAB discussed, pt agrees to try clonidine to help marijuana cessation, especially if she develops problems sleeping during increase of vyvanse, as opposed to self-medicating with THC. Aware of transition of care to a different physician at end of June 2024.  Plan: - increase vyvanse 40 mg to 50 mg daily- BRAND NAME ONLY (pt's pharmacy does not have generic in stock)  - EKG WNL - pt continues to regular monitor BP and HR; reports last values were WNL - continue Prozac 40 mg PO qdaily, for OPAL / Panic Disorder  - not currently using clonidine 0.1 mg po qhs for marijuana cessation; RAB discussed, pt counseled to monitor blood pressures at home. Pt has not started yet as she prefers to decrease usage independently.  - rtc in 3 weeks - continue therapy with Moe Sanz  Total time with pt: 20 min

## 2024-04-05 NOTE — PHYSICAL EXAM
[None] : none [Feeling Restless] : feeling ~L restless [Anxious] : anxious [Appropriate] : appropriate [Oriented] : oriented [Normal] : good [Tics] : no tics [Tremor] : ~T no ~M tremor was seen [Rigidity] : no rigidity [Dystonia] : no dystonia was noted [Inc Latency] : no increased latency [Pressured] : not pressured [Slowed] : not slowed [Soft] : not soft [Loud] : not loud [Talkative] : not talkative [Paucity] : no paucity [Mutism] : no mutism [Dysarthria] : no dysarthria [Stuttering] : no stuttering [Perseveration] : no perseveration [Rapid] : not rapid [Delayed] : not delayed [Illogical tangential] : no logical tangential [Loose Circumstantial] : no loose circumstantial [Impoverished] : content/associations not impoverished [Impaired] : abstract reasoning not impaired [AH] : no auditory hallucinations [VH] : no visual hallucinations [Suicidal Ideation] : no suicidal ideation [Homicidal Ideation] : no homicidal ideation [Afraid] : not afraid [Irritable] : no irritable [Dysphoric] : not dysphoric [Constricted] : not constricted [Labile] : not labile [Lethargic] : no lethargic [Unarousable] : not unarousable [FreeTextEntry5] : animated [de-identified] : improved on vyvanse, then plateaued on current dose

## 2024-04-05 NOTE — REASON FOR VISIT
[Patient preference] : as per patient preference [Starting, patient seen in-person within last 6 months] : Telehealth services are being started as patient has seen in-person within last 6 months. [Telehealth (audio & video) - Individual/Group] : This visit was provided via telehealth using real-time 2-way audio visual technology. [Medical Office: ([facility])___] : The provider was located at the medical office in [unfilled]. [Home] : The patient, [unfilled], was located at home, [unfilled], at the time of the visit. [Verbal consent obtained from patient/other participant(s)] : Verbal consent for telehealth/telephonic services obtained from patient/other participant(s) [Patient] : Patient [Follow-Up Visit] : a follow-up visit [FreeTextEntry4] : 11:03 am [FreeTextEntry5] : 11:30 am  [FreeTextEntry2] : 1/12/24 [FreeTextEntry1] : med management

## 2024-04-05 NOTE — HISTORY OF PRESENT ILLNESS
[No] : no [Responsibility to family or others] : responsibility to family or others [Identifies reasons for living] : identifies reasons for living [Future oriented] : future oriented [Engaged in work or school] : engaged in work or school [Supportive social network or family] : supportive social network or family [Positive therapeutic relationships] : positive therapeutic relationships [Ability to cope with stress] : ability to cope with stress [None Known] : none known [Substance abuse] : substance abuse [Residential stability] : residential stability [Relationship stability] : relationship stability [Engagement in treatment] : engagement in treatment [Affective stability] : affective stability [Good treatment response/ compliance] : good treatment response/ compliance [FreeTextEntry3] : \par   [de-identified] : Pt seen via [employer] for total of 25 minutes.   Pt has continued prozac 40 mg daily. Reports anxiety is manageable and denies depressed mood. Pt reports 40 mg vyvanse does not seem to be having better effect than 30 mg. Denies side effects. Continues to monitor vitals at home. We will increase to 50 mg- RAB discussed thoroughly. States she still uses cannabis (1 gummy at night and "1 hit" of a joint once a week); extensive psychoeducation provided on risks of cannabis use, impact on anxiety and focus, as well as risk of delirium or psychosis when mixed with a stimulant. Pt reports that she will consider quitting, agreeable to try clonidine to assist with this. She reports a total of 7h of sleep at night and about 2h of naps. Reports adequate appetite. She denies AE, including chest pain, Sx of julisa, agitation, psychosis, serotonin syndrome. Denies SI, HI, NSSIB.  [TextBox_32] : Low chronic and acute risk. Patient's risk factors include history of substance use and psychiatric history, mitigated by engagement in job, family support, high motivation, and compliance with medications/treatment.

## 2024-04-06 DIAGNOSIS — F41.8 OTHER SPECIFIED ANXIETY DISORDERS: ICD-10-CM

## 2024-04-06 DIAGNOSIS — F41.0 PANIC DISORDER [EPISODIC PAROXYSMAL ANXIETY]: ICD-10-CM

## 2024-04-06 DIAGNOSIS — F41.1 GENERALIZED ANXIETY DISORDER: ICD-10-CM

## 2024-04-06 DIAGNOSIS — F90.9 ATTENTION-DEFICIT HYPERACTIVITY DISORDER, UNSPECIFIED TYPE: ICD-10-CM

## 2024-04-22 ENCOUNTER — APPOINTMENT (OUTPATIENT)
Dept: PSYCHIATRY | Facility: CLINIC | Age: 34
End: 2024-04-22

## 2024-04-22 ENCOUNTER — NON-APPOINTMENT (OUTPATIENT)
Age: 34
End: 2024-04-22

## 2024-04-26 ENCOUNTER — OUTPATIENT (OUTPATIENT)
Dept: OUTPATIENT SERVICES | Facility: HOSPITAL | Age: 34
LOS: 1 days | End: 2024-04-26
Payer: COMMERCIAL

## 2024-04-26 ENCOUNTER — APPOINTMENT (OUTPATIENT)
Dept: PSYCHIATRY | Facility: CLINIC | Age: 34
End: 2024-04-26
Payer: COMMERCIAL

## 2024-04-26 DIAGNOSIS — F41.1 GENERALIZED ANXIETY DISORDER: ICD-10-CM

## 2024-04-26 DIAGNOSIS — F90.9 ATTENTION-DEFICIT HYPERACTIVITY DISORDER, UNSPECIFIED TYPE: ICD-10-CM

## 2024-04-26 DIAGNOSIS — F41.8 OTHER SPECIFIED ANXIETY DISORDERS: ICD-10-CM

## 2024-04-26 DIAGNOSIS — F41.0 PANIC DISORDER [EPISODIC PAROXYSMAL ANXIETY]: ICD-10-CM

## 2024-04-26 PROCEDURE — ZZZZZ: CPT

## 2024-04-26 PROCEDURE — 99214 OFFICE O/P EST MOD 30 MIN: CPT | Mod: 95

## 2024-04-26 RX ORDER — FLUOXETINE HYDROCHLORIDE 40 MG/1
40 CAPSULE ORAL
Qty: 30 | Refills: 1 | Status: ACTIVE | COMMUNITY
Start: 2021-01-07 | End: 1900-01-01

## 2024-04-26 NOTE — HISTORY OF PRESENT ILLNESS
[No] : no [Responsibility to family or others] : responsibility to family or others [Identifies reasons for living] : identifies reasons for living [Future oriented] : future oriented [Engaged in work or school] : engaged in work or school [Supportive social network or family] : supportive social network or family [Positive therapeutic relationships] : positive therapeutic relationships [Ability to cope with stress] : ability to cope with stress [None Known] : none known [Substance abuse] : substance abuse [Residential stability] : residential stability [Relationship stability] : relationship stability [Engagement in treatment] : engagement in treatment [Affective stability] : affective stability [Good treatment response/ compliance] : good treatment response/ compliance [FreeTextEntry3] : \par   [de-identified] : Pt seen via [employer] for total of 30 minutes.   Pt has continued prozac 40 mg daily. Reports anxiety is manageable and denies depressed mood. Reports she feels jittery when taking 50 mg vyvanse all at once in the morning and had a "crash" in the afternoon when her focus also wears off. Prefers to take vyvanse 50 mg TDD as 40 mg in AM and 10 mg in the afternoon. Reports clonidine 0.1 mg po qhs made her dizzy; we discussed trying it again at 0.05 mg instead for marijuana cessation. She reports a total of 7h of sleep at night and about 2h of naps. Reports adequate appetite. She denies AE, including chest pain, Sx of julisa, agitation, psychosis, serotonin syndrome. Denies SI, HI, NSSIB.  [TextBox_32] : Low chronic and acute risk. Patient's risk factors include history of substance use and psychiatric history, mitigated by engagement in job, family support, high motivation, and compliance with medications/treatment.

## 2024-04-26 NOTE — REASON FOR VISIT
[Patient preference] : as per patient preference [Starting, patient seen in-person within last 6 months] : Telehealth services are being started as patient has seen in-person within last 6 months. [Telehealth (audio & video) - Individual/Group] : This visit was provided via telehealth using real-time 2-way audio visual technology. [Medical Office: ([facility])___] : The provider was located at the medical office in [unfilled]. [Home] : The patient, [unfilled], was located at home, [unfilled], at the time of the visit. [Verbal consent obtained from patient/other participant(s)] : Verbal consent for telehealth/telephonic services obtained from patient/other participant(s) [Patient] : Patient [Follow-Up Visit] : a follow-up visit [FreeTextEntry4] : 11:35 am [FreeTextEntry5] : 12:00 pm [FreeTextEntry2] : 1/12/24 [FreeTextEntry1] : med management

## 2024-04-26 NOTE — PHYSICAL EXAM
[None] : none [Feeling Restless] : feeling ~L restless [Anxious] : anxious [Appropriate] : appropriate [Oriented] : oriented [Normal] : good [Tics] : no tics [Tremor] : ~T no ~M tremor was seen [Rigidity] : no rigidity [Dystonia] : no dystonia was noted [Inc Latency] : no increased latency [Pressured] : not pressured [Slowed] : not slowed [Soft] : not soft [Loud] : not loud [Talkative] : not talkative [Paucity] : no paucity [Mutism] : no mutism [Dysarthria] : no dysarthria [Stuttering] : no stuttering [Perseveration] : no perseveration [Rapid] : not rapid [Delayed] : not delayed [Illogical tangential] : no logical tangential [Loose Circumstantial] : no loose circumstantial [Impoverished] : content/associations not impoverished [Impaired] : abstract reasoning not impaired [AH] : no auditory hallucinations [VH] : no visual hallucinations [Suicidal Ideation] : no suicidal ideation [Homicidal Ideation] : no homicidal ideation [Afraid] : not afraid [Irritable] : no irritable [Dysphoric] : not dysphoric [Constricted] : not constricted [Labile] : not labile [Lethargic] : no lethargic [Unarousable] : not unarousable [de-identified] : improved on vyvanse, then plateaued on current dose [FreeTextEntry5] : animated

## 2024-04-26 NOTE — ASSESSMENT
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 Presenting for f/u for med management.   Extensive Hx and evaluation performed for ADHD dx. Pt sufficiently meets criteria for adult ADHD, and was started on vyvanse; pt has never overused vyvanse or adderall, or other substances, though she acknowledges that vyvanse and adderall were illicitly obtained to self-medicate in the past.   Reports palpitations on vyvanse have stopped, EKG was WNL and pt was evaluated by a cardiologist (no issues were reported by cardiologist). Reports she feels jittery when taking 50 mg vyvanse all at once in the morning and had a "crash" in the afternoon when her focus also wears off. Prefers to take vyvanse 50 mg TDD as 40 mg in AM and 10 mg in the afternoon. She continues to take prozac 40 mg after social stressors improved. Pt also provided extensive psychoed on risks of using depressants, such as marijuana or alcohol, on vyvanse, as well as depressants' effects on mood and anxiety. Pt has decreased use to approx 2/week and plans to eventually stop marijuana; RAB discussed, pt agrees to try clonidine to help marijuana cessation, especially if she develops problems sleeping during increase of vyvanse, as opposed to self-medicating with THC. Reports clonidine 0.1 mg po qhs made her dizzy; we discussed trying it again at 0.05 mg instead for marijuana cessation. RAB discussed, pt counseled to monitor blood pressures at home. Will try 0.05 mg mg qhs in a week or later. Aware of transition of care to a different physician at end of June 2024. Pt leaving for [facility] for 12 days on May 8th.   Plan: - pt prefers to take vyvanse 50 mg TDD as 40 mg in AM and 10 mg in the afternoon. *BRAND NAME ONLY (generic is on back-order)  - EKG WNL - pt continues to regular monitor BP and HR; reports last values were WNL - continue Prozac 40 mg PO qdaily, for OPAL / Panic Disorder - reports clonidine 0.1 mg po qhs made her dizzy; we discussed trying it again at 0.05 mg instead for marijuana cessation. RAB discussed, pt counseled to monitor blood pressures at home. Will try 0.05 mg mg qhs in a week or later.  - f/u in 4 weeks - continue therapy with Moe Sanz  Total time with pt: 30 min

## 2024-04-27 DIAGNOSIS — F41.1 GENERALIZED ANXIETY DISORDER: ICD-10-CM

## 2024-04-27 DIAGNOSIS — F41.8 OTHER SPECIFIED ANXIETY DISORDERS: ICD-10-CM

## 2024-04-27 DIAGNOSIS — F41.0 PANIC DISORDER [EPISODIC PAROXYSMAL ANXIETY]: ICD-10-CM

## 2024-04-27 DIAGNOSIS — F90.9 ATTENTION-DEFICIT HYPERACTIVITY DISORDER, UNSPECIFIED TYPE: ICD-10-CM

## 2024-05-23 ENCOUNTER — APPOINTMENT (OUTPATIENT)
Dept: PSYCHIATRY | Facility: CLINIC | Age: 34
End: 2024-05-23
Payer: COMMERCIAL

## 2024-05-23 ENCOUNTER — OUTPATIENT (OUTPATIENT)
Dept: OUTPATIENT SERVICES | Facility: HOSPITAL | Age: 34
LOS: 1 days | End: 2024-05-23
Payer: COMMERCIAL

## 2024-05-23 DIAGNOSIS — F12.20 CANNABIS DEPENDENCE, UNCOMPLICATED: ICD-10-CM

## 2024-05-23 DIAGNOSIS — F41.8 OTHER SPECIFIED ANXIETY DISORDERS: ICD-10-CM

## 2024-05-23 DIAGNOSIS — F41.0 PANIC DISORDER [EPISODIC PAROXYSMAL ANXIETY]: ICD-10-CM

## 2024-05-23 DIAGNOSIS — F90.9 ATTENTION-DEFICIT HYPERACTIVITY DISORDER, UNSPECIFIED TYPE: ICD-10-CM

## 2024-05-23 DIAGNOSIS — F41.1 GENERALIZED ANXIETY DISORDER: ICD-10-CM

## 2024-05-23 PROCEDURE — ZZZZZ: CPT

## 2024-05-23 PROCEDURE — 99213 OFFICE O/P EST LOW 20 MIN: CPT | Mod: 95

## 2024-05-23 RX ORDER — FLUOXETINE HYDROCHLORIDE 40 MG/1
40 CAPSULE ORAL
Qty: 30 | Refills: 2 | Status: ACTIVE | COMMUNITY
Start: 2023-12-28 | End: 1900-01-01

## 2024-05-23 RX ORDER — HYDROXYZINE HYDROCHLORIDE 50 MG/1
50 TABLET ORAL EVERY 8 HOURS
Qty: 90 | Refills: 0 | Status: DISCONTINUED | COMMUNITY
Start: 2023-10-25 | End: 2024-05-23

## 2024-05-23 RX ORDER — CLONIDINE HYDROCHLORIDE 0.1 MG/1
0.1 TABLET ORAL
Qty: 30 | Refills: 0 | Status: DISCONTINUED | COMMUNITY
Start: 2024-02-09 | End: 2024-05-23

## 2024-05-23 NOTE — HISTORY OF PRESENT ILLNESS
[No] : no [Responsibility to family or others] : responsibility to family or others [Identifies reasons for living] : identifies reasons for living [Future oriented] : future oriented [Engaged in work or school] : engaged in work or school [Supportive social network or family] : supportive social network or family [Positive therapeutic relationships] : positive therapeutic relationships [Ability to cope with stress] : ability to cope with stress [None Known] : none known [Substance abuse] : substance abuse [Residential stability] : residential stability [Relationship stability] : relationship stability [Engagement in treatment] : engagement in treatment [Affective stability] : affective stability [Good treatment response/ compliance] : good treatment response/ compliance [FreeTextEntry3] : \par   [de-identified] : Pt seen via [employer] for total of 20 minutes.   Pt returned from [place] from her friend's wedding. Reports she also finished her semester and has the summer off. 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 Pt has continued prozac 40 mg daily. Reports anxiety is manageable and denies depressed mood. Prefers to take vyvanse 50 mg TDD as 40 mg in AM and 10 mg in the afternoon. Could not tolerate clonidine; she has decreased cannabis gummy use significantly. Psychoeducation and RAB of ongoing gummy use provided extensively. Reports adequate appetite. She denies AE, including chest pain, Sx of julisa, agitation, psychosis, serotonin syndrome. Denies SI, HI, NSSIB.  [TextBox_32] : Low chronic and acute risk. Patient's risk factors include history of substance use and psychiatric history, mitigated by engagement in job, family support, high motivation, and compliance with medications/treatment.

## 2024-05-23 NOTE — ASSESSMENT
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 Presenting for f/u for med management.   Extensive Hx and evaluation performed for ADHD dx. Pt sufficiently meets criteria for adult ADHD and was started on vyvanse; pt has never overused vyvanse or adderall, or other substances, though she acknowledges that vyvanse and adderall were illicitly obtained to self-medicate in the past.   Reports palpitations on vyvanse have stopped, EKG was WNL and pt was evaluated by a cardiologist (no issues were reported by cardiologist). Reports she feels jittery when taking 50 mg vyvanse all at once in the morning and had a "crash" in the afternoon when her focus also wears off. Prefers to take vyvanse 50 mg TDD as 40 mg in AM and 10 mg in the afternoon. She continues to take prozac 40 mg after social stressors improved. Pt also provided extensive psychoed on risks of using depressants, such as marijuana or alcohol, on vyvanse, as well as depressants' effects on mood and anxiety. Pt has decreased use to approx 2/week and plans to eventually stop marijuana; pt will consider switching to CBD only. She could not tolerate clonidine. Aware of transition of care to a different physician at end of June 2024.  Plan: - pt prefers to take vyvanse 50 mg TDD as 40 mg in AM and 10 mg in the afternoon. *BRAND NAME ONLY (generic is on back-order)  - EKG WNL - pt continues to regular monitor BP and HR; reports last values were WNL - continue Prozac 40 mg PO qdaily, for OPAL / Panic Disorder - could not tolerate clonidine for marijuana cessation - f/u in 3 weeks - continue therapy with Moe Sanz  Total time with pt: 20 min

## 2024-05-23 NOTE — PHYSICAL EXAM
[None] : none [Feeling Restless] : feeling ~L restless [Appropriate] : appropriate [Oriented] : oriented [Normal] : good [Tics] : no tics [Tremor] : ~T no ~M tremor was seen [Rigidity] : no rigidity [Dystonia] : no dystonia was noted [Inc Latency] : no increased latency [Pressured] : not pressured [Slowed] : not slowed [Soft] : not soft [Loud] : not loud [Talkative] : not talkative [Paucity] : no paucity [Mutism] : no mutism [Dysarthria] : no dysarthria [Stuttering] : no stuttering [Perseveration] : no perseveration [Rapid] : not rapid [Delayed] : not delayed [Illogical tangential] : no logical tangential [Loose Circumstantial] : no loose circumstantial [Impoverished] : content/associations not impoverished [Impaired] : abstract reasoning not impaired [AH] : no auditory hallucinations [VH] : no visual hallucinations [Suicidal Ideation] : no suicidal ideation [Homicidal Ideation] : no homicidal ideation [Anxious] : no anxious [Afraid] : not afraid [Irritable] : no irritable [Dysphoric] : not dysphoric [Constricted] : not constricted [Labile] : not labile [Lethargic] : no lethargic [Unarousable] : not unarousable [FreeTextEntry5] : animated [de-identified] : improved on vyvanse, then plateaued on current dose

## 2024-05-23 NOTE — REASON FOR VISIT
[Patient preference] : as per patient preference [Starting, patient seen in-person within last 6 months] : Telehealth services are being started as patient has seen in-person within last 6 months. [Telehealth (audio & video) - Individual/Group] : This visit was provided via telehealth using real-time 2-way audio visual technology. [Medical Office: ([facility])___] : The provider was located at the medical office in [unfilled]. [Home] : The patient, [unfilled], was located at home, [unfilled], at the time of the visit. [Verbal consent obtained from patient/other participant(s)] : Verbal consent for telehealth/telephonic services obtained from patient/other participant(s) [Patient] : Patient [Follow-Up Visit] : a follow-up visit [FreeTextEntry4] : 11:35 am [FreeTextEntry5] : 11:55 pm [FreeTextEntry2] : 1/12/24 [FreeTextEntry1] : med management

## 2024-05-24 DIAGNOSIS — F41.0 PANIC DISORDER [EPISODIC PAROXYSMAL ANXIETY]: ICD-10-CM

## 2024-05-24 DIAGNOSIS — F41.8 OTHER SPECIFIED ANXIETY DISORDERS: ICD-10-CM

## 2024-05-24 DIAGNOSIS — F41.1 GENERALIZED ANXIETY DISORDER: ICD-10-CM

## 2024-05-24 DIAGNOSIS — F90.9 ATTENTION-DEFICIT HYPERACTIVITY DISORDER, UNSPECIFIED TYPE: ICD-10-CM

## 2024-05-24 DIAGNOSIS — F12.20 CANNABIS DEPENDENCE, UNCOMPLICATED: ICD-10-CM

## 2024-06-02 ENCOUNTER — NON-APPOINTMENT (OUTPATIENT)
Age: 34
End: 2024-06-02

## 2024-06-03 NOTE — DISCUSSION/SUMMARY
[FreeTextEntry1] : The patient reached out today to schedule next therapy session. She stated that she has been in [place] for a wedding, and then her partner contracted COVID, therefore she had to stay home for a few weeks. We will resume therapy sessions going forward. The patient will call back with day/time to meet.

## 2024-06-12 ENCOUNTER — NON-APPOINTMENT (OUTPATIENT)
Age: 34
End: 2024-06-12

## 2024-06-14 ENCOUNTER — OUTPATIENT (OUTPATIENT)
Dept: OUTPATIENT SERVICES | Facility: HOSPITAL | Age: 34
LOS: 1 days | End: 2024-06-14
Payer: COMMERCIAL

## 2024-06-14 ENCOUNTER — APPOINTMENT (OUTPATIENT)
Dept: PSYCHIATRY | Facility: CLINIC | Age: 34
End: 2024-06-14
Payer: COMMERCIAL

## 2024-06-14 DIAGNOSIS — F12.20 CANNABIS DEPENDENCE, UNCOMPLICATED: ICD-10-CM

## 2024-06-14 DIAGNOSIS — F41.0 PANIC DISORDER [EPISODIC PAROXYSMAL ANXIETY]: ICD-10-CM

## 2024-06-14 DIAGNOSIS — F41.8 OTHER SPECIFIED ANXIETY DISORDERS: ICD-10-CM

## 2024-06-14 DIAGNOSIS — F41.1 GENERALIZED ANXIETY DISORDER: ICD-10-CM

## 2024-06-14 DIAGNOSIS — F90.9 ATTENTION-DEFICIT HYPERACTIVITY DISORDER, UNSPECIFIED TYPE: ICD-10-CM

## 2024-06-14 PROCEDURE — ZZZZZ: CPT

## 2024-06-14 PROCEDURE — 99213 OFFICE O/P EST LOW 20 MIN: CPT | Mod: 95

## 2024-06-14 RX ORDER — LISDEXAMFETAMINE DIMESYLATE 50 MG/1
50 CAPSULE ORAL DAILY
Qty: 30 | Refills: 0 | Status: ACTIVE | COMMUNITY
Start: 2023-12-19 | End: 1900-01-01

## 2024-06-14 RX ORDER — FLUOXETINE HYDROCHLORIDE 10 MG/1
10 CAPSULE ORAL
Qty: 90 | Refills: 0 | Status: DISCONTINUED | COMMUNITY
Start: 2024-01-12 | End: 2024-06-14

## 2024-06-14 RX ORDER — LISDEXAMFETAMINE DIMESYLATE 10 MG/1
10 CAPSULE ORAL
Qty: 30 | Refills: 0 | Status: DISCONTINUED | COMMUNITY
Start: 2024-03-08 | End: 2024-06-14

## 2024-06-14 NOTE — ASSESSMENT
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 Presenting for f/u for med management.   Extensive Hx and evaluation performed for ADHD dx. Pt sufficiently meets criteria for adult ADHD and was started on vyvanse; pt has never overused vyvanse or adderall, or other substances, though she acknowledges that vyvanse and adderall were illicitly obtained to self-medicate in the past.   Reports palpitations on vyvanse have stopped, EKG was WNL and pt was evaluated by a cardiologist (no issues were reported by cardiologist). Reports she feels comfortable after resuming vyvanse 50 mg daily. Pt also provided extensive psychoed on risks of using depressants, such as marijuana or alcohol, on vyvanse, as well as depressants' effects on mood and anxiety. Pt has decreased use to approx 2 times/week and plans to eventually stop marijuana; pt will consider switching to CBD only. She could not tolerate clonidine. Aware of transition of care to a different physician at end of June 2024. Thanks the provider for her help.  Plan: - c/w vyvanse 50 mg in AM. *BRAND NAME ONLY (generic is on back-order)  - EKG WNL - pt continues to regular monitor BP and HR; reports last values were WNL - continue Prozac 40 mg PO qdaily, for OPAL / Panic Disorder - could not tolerate clonidine for marijuana cessation - f/u in 4 weeks with next provider - continue therapy with Moe Sanz  Total time with pt: 22 min

## 2024-06-14 NOTE — HISTORY OF PRESENT ILLNESS
[No] : no [Responsibility to family or others] : responsibility to family or others [Identifies reasons for living] : identifies reasons for living [Future oriented] : future oriented [Engaged in work or school] : engaged in work or school [Supportive social network or family] : supportive social network or family [Positive therapeutic relationships] : positive therapeutic relationships [Ability to cope with stress] : ability to cope with stress [None Known] : none known [Substance abuse] : substance abuse [Residential stability] : residential stability [Relationship stability] : relationship stability [Engagement in treatment] : engagement in treatment [Affective stability] : affective stability [Good treatment response/ compliance] : good treatment response/ compliance [FreeTextEntry3] : \par   [de-identified] : Pt seen via [employer] for total of 22 minutes.   Reports anxiety is manageable and denies depressed mood. Reverted back to taking vyvanse 50 mg daily as opposed to 40 mg in AM and 10 mg in the afternoon, as the separate doses did not help her mood or impulse control (reports skin picking, "mindless eating", and more mood fluctuations). Psychoeducation and RAB of ongoing cannabis gummy use reviewed. Reports adequate appetite. She denies AE, including chest pain, Sx of julisa, agitation, psychosis, serotonin syndrome. Denies SI, HI, NSSIB. Denies complaints about current regimen and feels stable.  [TextBox_32] : Low chronic and acute risk. Patient's risk factors include history of substance use and psychiatric history, mitigated by engagement in job, family support, high motivation, and compliance with medications/treatment.

## 2024-06-14 NOTE — PHYSICAL EXAM
[None] : none [Feeling Restless] : feeling ~L restless [Appropriate] : appropriate [Oriented] : oriented [Normal] : good [Tics] : no tics [Tremor] : ~T no ~M tremor was seen [Rigidity] : no rigidity [Dystonia] : no dystonia was noted [Inc Latency] : no increased latency [Pressured] : not pressured [Slowed] : not slowed [Soft] : not soft [Loud] : not loud [Talkative] : not talkative [Paucity] : no paucity [Mutism] : no mutism [Dysarthria] : no dysarthria [Stuttering] : no stuttering [Perseveration] : no perseveration [Rapid] : not rapid [Delayed] : not delayed [Illogical tangential] : no logical tangential [Loose Circumstantial] : no loose circumstantial [Impoverished] : content/associations not impoverished [Impaired] : abstract reasoning not impaired [AH] : no auditory hallucinations [VH] : no visual hallucinations [Suicidal Ideation] : no suicidal ideation [Homicidal Ideation] : no homicidal ideation [Anxious] : no anxious [Afraid] : not afraid [Irritable] : no irritable [Dysphoric] : not dysphoric [Constricted] : not constricted [Labile] : not labile [Lethargic] : no lethargic [Unarousable] : not unarousable [FreeTextEntry5] : animated [FreeTextEntry8] : good [de-identified] : improved vastly on vyvanse

## 2024-06-14 NOTE — REASON FOR VISIT
[Patient preference] : as per patient preference [Starting, patient seen in-person within last 6 months] : Telehealth services are being started as patient has seen in-person within last 6 months. [Telehealth (audio & video) - Individual/Group] : This visit was provided via telehealth using real-time 2-way audio visual technology. [Medical Office: ([facility])___] : The provider was located at the medical office in [unfilled]. [Home] : The patient, [unfilled], was located at home, [unfilled], at the time of the visit. [Verbal consent obtained from patient/other participant(s)] : Verbal consent for telehealth/telephonic services obtained from patient/other participant(s) [Patient] : Patient [Follow-Up Visit] : a follow-up visit [FreeTextEntry4] : 11:32 am [FreeTextEntry5] : 12:00 pm [FreeTextEntry2] : 1/12/24 [FreeTextEntry1] : med management

## 2024-06-14 NOTE — CURRENT PSYCHIATRIC SYMPTOMS
[Decreased Concentration] : decreased concentrating ability [Increased Activity] : increased activity [Distractibility] : distractibility [Talkativeness] : talkativeness [Depressed Mood] : no depressed mood [Anhedonia] : no anhedonia [Guilt] : not feeling guilty [Hyperphagia] : no hyperphagia [Insomnia] : no insomnia disorder [Hypersomnia] : no ~T hypersomnia [Psychomotor Retardation] : no psychomotor retardation [Euphoria] : no euphoria [Highly Irritable] : no high irritability [Dec Need For Sleep] : no decreased need for sleep [Delusions] : no ~T delusions [Hallucination Visual] : no visual hallucinations [Hallucination Auditory] : no auditory hallucinations [Excessive Worry] : no excessive worries [Ruminations] : no rumination disorder [Restlessness] : no restlessness [Panic] : no panic disorder [Recent Onset] : no recent onset of confusion

## 2024-06-15 DIAGNOSIS — F41.8 OTHER SPECIFIED ANXIETY DISORDERS: ICD-10-CM

## 2024-06-15 DIAGNOSIS — F41.1 GENERALIZED ANXIETY DISORDER: ICD-10-CM

## 2024-06-15 DIAGNOSIS — F41.0 PANIC DISORDER [EPISODIC PAROXYSMAL ANXIETY]: ICD-10-CM

## 2024-06-15 DIAGNOSIS — F90.9 ATTENTION-DEFICIT HYPERACTIVITY DISORDER, UNSPECIFIED TYPE: ICD-10-CM

## 2024-06-15 DIAGNOSIS — F12.20 CANNABIS DEPENDENCE, UNCOMPLICATED: ICD-10-CM

## 2024-07-01 ENCOUNTER — NON-APPOINTMENT (OUTPATIENT)
Age: 34
End: 2024-07-01

## 2024-07-02 ENCOUNTER — NON-APPOINTMENT (OUTPATIENT)
Age: 34
End: 2024-07-02

## 2024-07-09 ENCOUNTER — APPOINTMENT (OUTPATIENT)
Dept: PSYCHIATRY | Facility: CLINIC | Age: 34
End: 2024-07-09

## 2024-07-09 ENCOUNTER — OUTPATIENT (OUTPATIENT)
Dept: OUTPATIENT SERVICES | Facility: HOSPITAL | Age: 34
LOS: 1 days | End: 2024-07-09
Payer: COMMERCIAL

## 2024-07-09 DIAGNOSIS — F90.9 ATTENTION-DEFICIT HYPERACTIVITY DISORDER, UNSPECIFIED TYPE: ICD-10-CM

## 2024-07-09 DIAGNOSIS — F33.1 MAJOR DEPRESSIVE DISORDER, RECURRENT, MODERATE: ICD-10-CM

## 2024-07-09 DIAGNOSIS — F41.0 PANIC DISORDER [EPISODIC PAROXYSMAL ANXIETY]: ICD-10-CM

## 2024-07-09 DIAGNOSIS — G47.00 INSOMNIA, UNSPECIFIED: ICD-10-CM

## 2024-07-09 DIAGNOSIS — F41.1 GENERALIZED ANXIETY DISORDER: ICD-10-CM

## 2024-07-09 PROCEDURE — 99213 OFFICE O/P EST LOW 20 MIN: CPT

## 2024-07-09 PROCEDURE — ZZZZZ: CPT

## 2024-07-10 DIAGNOSIS — G47.00 INSOMNIA, UNSPECIFIED: ICD-10-CM

## 2024-07-10 DIAGNOSIS — F41.1 GENERALIZED ANXIETY DISORDER: ICD-10-CM

## 2024-07-10 DIAGNOSIS — F41.0 PANIC DISORDER [EPISODIC PAROXYSMAL ANXIETY]: ICD-10-CM

## 2024-07-10 DIAGNOSIS — F90.9 ATTENTION-DEFICIT HYPERACTIVITY DISORDER, UNSPECIFIED TYPE: ICD-10-CM

## 2024-08-06 ENCOUNTER — APPOINTMENT (OUTPATIENT)
Dept: PSYCHIATRY | Facility: CLINIC | Age: 34
End: 2024-08-06

## 2024-08-06 ENCOUNTER — NON-APPOINTMENT (OUTPATIENT)
Age: 34
End: 2024-08-06

## 2024-08-06 ENCOUNTER — OUTPATIENT (OUTPATIENT)
Dept: OUTPATIENT SERVICES | Facility: HOSPITAL | Age: 34
LOS: 1 days | End: 2024-08-06
Payer: COMMERCIAL

## 2024-08-06 DIAGNOSIS — F90.9 ATTENTION-DEFICIT HYPERACTIVITY DISORDER, UNSPECIFIED TYPE: ICD-10-CM

## 2024-08-06 DIAGNOSIS — F41.1 GENERALIZED ANXIETY DISORDER: ICD-10-CM

## 2024-08-06 DIAGNOSIS — F41.0 PANIC DISORDER [EPISODIC PAROXYSMAL ANXIETY]: ICD-10-CM

## 2024-08-06 DIAGNOSIS — F33.1 MAJOR DEPRESSIVE DISORDER, RECURRENT, MODERATE: ICD-10-CM

## 2024-08-06 PROCEDURE — ZZZZZ: CPT

## 2024-08-06 PROCEDURE — 99214 OFFICE O/P EST MOD 30 MIN: CPT | Mod: 95

## 2024-08-06 NOTE — DISCUSSION/SUMMARY
[FreeTextEntry1] : Writer was contacted by resident to prescribe controlled substance for patient. Istop #[id number] Rx was sent for Vyvanse 50mg 30 day supply

## 2024-08-06 NOTE — HISTORY OF PRESENT ILLNESS
[Yes] : yes [No] : no [Responsibility to family or others] : responsibility to family or others [Identifies reasons for living] : identifies reasons for living [Future oriented] : future oriented [Engaged in work or school] : engaged in work or school [Supportive social network or family] : supportive social network or family [Positive therapeutic relationships] : positive therapeutic relationships [Ability to cope with stress] : ability to cope with stress [None Known] : none known [Substance abuse] : substance abuse [Residential stability] : residential stability [Relationship stability] : relationship stability [Engagement in treatment] : engagement in treatment [Affective stability] : affective stability [Good treatment response/ compliance] : good treatment response/ compliance [FreeTextEntry1] : Pt seen via [facility] for total of 36 minutes. Of note, there were 4 minutes during which patient and provider were disconnected but call was re-established shortly and call continued.  Patient on presentation appeared calm polite and cooperative. Patient started interview by stating that she felt like the last month her anxiety has been worse. She reported three instances of times when she experienced anxiety out of the ordinary and would become frustrated and shout at her partner and would have chest discomfort as well. Patient continues to use meditation and exercise as coping skills and reported that exercise did improve the chest pain when it is anxiety related. 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 Most recently, patient went to visit her family for a few days and after emotional distress from this visit, patient returned particularly distressed feeling particularly anxious that Friday night went out with friends and reported to drink an excessive amount of alcohol and the following day, patient reported feeling sad and that she did not want to get out of bed for the entire day, though she endorsed it may have been related to symptoms of alcohol withdrawal from the previous night, but reported that for the first time in a long time, patient had passive suicidal ideation during this point but patient directly reported that it felt more like a sense of helplessness and that she wanted a quick end to her distress than a true desire to die, and she denied any plan or intent, and her mood did improve the next day. Patient did discuss her coping skills, people she could contact for help, and was counselled that the ED was always an option should she ever an escalation of negative thoughts to active ideation. Patient did endorse taking a single Adderall tablet from a friend that afternoon so she could engage in her old hobbies and start to feel better again, which she acknowledged as not the greatest decision. Patient was counselled to avoid using any medications that were not directly prescribed to her  Patient otherwise denied any changes in appetite and reported improved sleep about 7-8 hours a night, which patient endorsed to be improved by taking one 50 mg Hydroxyzine tablet (from an earlier prescription) before bedtime the last two nights. Patient also endorsed using less of the "creative" CBD gummy from last month that kept her more awake but she did endorse using a new "chill drop" gummies recently, that she purchases from marijuana dispensary, as needed for anxiety in the last few days. No current signs of acute julisa, depression, or psychosis elicited.  Patient reports that she worries that her Vyvanse is not addressing her ADHD symptoms after 1-3 pm most days. Patient also asked if there were other therapists in the clinic that would have more experience with ADHD patients. [FreeTextEntry3] : \par   [TextBox_32] : Low chronic and acute risk. Patient's risk factors include history of substance use and psychiatric history, mitigated by engagement in job, family support, high motivation, and compliance with medications/treatment.

## 2024-08-06 NOTE — PHYSICAL EXAM
[Appropriate] : appropriate [Oriented] : oriented [Inc Latency] : no increased latency [Pressured] : not pressured [Slowed] : not slowed [Soft] : not soft [Loud] : not loud [Talkative] : not talkative [Paucity] : no paucity [Mutism] : no mutism [Dysarthria] : no dysarthria [Stuttering] : no stuttering [Perseveration] : no perseveration [Rapid] : not rapid [Delayed] : not delayed [Illogical tangential] : no logical tangential [Loose Circumstantial] : no loose circumstantial [Impoverished] : content/associations not impoverished [Impaired] : abstract reasoning not impaired [AH] : no auditory hallucinations [VH] : no visual hallucinations [Delusions] : no ~T delusions [Confabulation] : no confabulation was observed [Homicidal Ideation] : no homicidal ideation [Afraid] : not afraid [Irritable] : no irritable [Dysphoric] : not dysphoric [Constricted] : not constricted [Labile] : not labile [Lethargic] : no lethargic [Unarousable] : not unarousable [Normal] : normal [FreeTextEntry5] : animated

## 2024-08-06 NOTE — RISK ASSESSMENT
[No, patient denies ideation or behavior] : No, patient denies ideation or behavior [Yes] : Yes [Low acute suicide risk] : Low acute suicide risk [No] : No

## 2024-08-06 NOTE — PAST MEDICAL HISTORY
[FreeTextEntry1] : No h/o IPP, SA/SHB 2016: OPD Bk for dep/anx, Wellbutrin, lexapro + vistaril  2015: became addicted to Adderall (thru gf) 2013-14: tried [employer] Wellbutrin 150-300mg 2011:  (during 1st Masters program in [place]) 1st time Rxed meds for panic attacks, lexparo/Xanax x 1-2 yrs.  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

## 2024-08-06 NOTE — REASON FOR VISIT
[Patient preference] : as per patient preference [Continuity of care] : to ensure continuity of care [Starting, patient seen in-person within last 6 months] : Telehealth services are being started as patient has seen in-person within last 6 months. [Telehealth (audio & video) - Individual/Group] : This visit was provided via telehealth using real-time 2-way audio visual technology. [Medical Office: ([facility])___] : The provider was located at the medical office in [unfilled]. [Home] : The patient, [unfilled], was located at home, [unfilled], at the time of the visit. [Verbal consent obtained from patient/other participant(s)] : Verbal consent for telehealth/telephonic services obtained from patient/other participant(s) [Patient] : Patient [Follow-Up Visit] : a follow-up visit [FreeTextEntry4] : 11:04 am [FreeTextEntry5] : 11:40 pm [FreeTextEntry2] : 1/12/24 [FreeTextEntry1] : med management

## 2024-08-06 NOTE — ASSESSMENT
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 Presenting for f/u for med management.   Extensive Hx and evaluation performed for ADHD dx. Pt sufficiently meets criteria for adult ADHD and was started on vyvanse; pt has never overused vyvanse or adderall, or other substances, though she acknowledges that vyvanse and adderall were illicitly obtained to self-medicate in the past, which patient repeated on 8/3. Patient is currently reporting that she does not believe that her current dose of Vyvanse is treating her ADHD symptoms the whole day.   Patient still has episodes of breakthrough anxiety and depression and when combined with alcohol or other depressants can result in patient having passive thoughts about now wanting to be alive. Patient denied any plans or intent but was counselled to ask for help with any friend, provider, or the ED were these negative thoughts to escalate in any way. Patient's Prozac will be increased to 60 mg daily which patient was agreeable to. Patient demonstrates continued low frustration tolerance and a quick escalation to using substances or diverted medication to address sadness or anxiety. Patient would benefit from continued therapy, though she asked if there was anyone in the clinic who had specialized in ADHD treatment. Patient to follow up in one month. Patient remains at low acute risk of harm to self or others and appropriate for outpatient management, but patient's risk factors of low frustration tolerance and use of substances as coping skills indicate further monitoring of patients.  Plan: - c/w vyvanse 50 mg in AM. *BRAND NAME ONLY (generic is on back-order)  - Increase Prozac to 60 mg PO qdaily, for OPAL / Panic Disorder - Start Hydroxyzine PO 50 mg Twice daily PRN as needed for anxiety/insomnia - f/u in 4 weeks - continue therapy with Moe Sanz  Total time with pt: 36 min

## 2024-08-07 DIAGNOSIS — F90.9 ATTENTION-DEFICIT HYPERACTIVITY DISORDER, UNSPECIFIED TYPE: ICD-10-CM

## 2024-08-07 DIAGNOSIS — F41.1 GENERALIZED ANXIETY DISORDER: ICD-10-CM

## 2024-08-07 DIAGNOSIS — F41.0 PANIC DISORDER [EPISODIC PAROXYSMAL ANXIETY]: ICD-10-CM

## 2024-08-16 ENCOUNTER — NON-APPOINTMENT (OUTPATIENT)
Age: 34
End: 2024-08-16

## 2024-08-16 NOTE — DISCUSSION/SUMMARY
[FreeTextEntry1] : Reached out to patient and left a VM about continuing therapy sessions. Will wait for patient to call back.

## 2024-09-11 ENCOUNTER — APPOINTMENT (OUTPATIENT)
Dept: PSYCHIATRY | Facility: CLINIC | Age: 34
End: 2024-09-11

## 2024-09-11 ENCOUNTER — NON-APPOINTMENT (OUTPATIENT)
Age: 34
End: 2024-09-11

## 2024-09-20 ENCOUNTER — NON-APPOINTMENT (OUTPATIENT)
Age: 34
End: 2024-09-20

## 2024-09-20 ENCOUNTER — APPOINTMENT (OUTPATIENT)
Dept: PSYCHIATRY | Facility: CLINIC | Age: 34
End: 2024-09-20

## 2024-10-03 ENCOUNTER — NON-APPOINTMENT (OUTPATIENT)
Age: 34
End: 2024-10-03

## 2024-10-03 ENCOUNTER — APPOINTMENT (OUTPATIENT)
Dept: PSYCHIATRY | Facility: CLINIC | Age: 34
End: 2024-10-03
Payer: COMMERCIAL

## 2024-10-03 ENCOUNTER — OUTPATIENT (OUTPATIENT)
Dept: OUTPATIENT SERVICES | Facility: HOSPITAL | Age: 34
LOS: 1 days | End: 2024-10-03
Payer: COMMERCIAL

## 2024-10-03 DIAGNOSIS — F41.1 GENERALIZED ANXIETY DISORDER: ICD-10-CM

## 2024-10-03 DIAGNOSIS — F90.9 ATTENTION-DEFICIT HYPERACTIVITY DISORDER, UNSPECIFIED TYPE: ICD-10-CM

## 2024-10-03 PROCEDURE — ZZZZZ: CPT

## 2024-10-03 PROCEDURE — 99214 OFFICE O/P EST MOD 30 MIN: CPT | Mod: 95

## 2024-10-03 NOTE — DISCUSSION/SUMMARY
[FreeTextEntry1] : Spoke about this pt in case conference with Dr. HARMON and he will try to encourage her to reengage in therapy.

## 2024-10-03 NOTE — ASSESSMENT
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 Presenting for f/u for med management.   Extensive Hx and evaluation performed for ADHD dx. Pt sufficiently meets criteria for adult ADHD and was started on vyvanse; pt has never overused vyvanse or adderall, or other substances, though she acknowledges that vyvanse and adderall were illicitly obtained to self-medicate in the past, which patient repeated on 8/3. Patient is currently reporting that she does not believe that her current dose of Vyvanse does not last through the whole day and requested that her Vyvanse 50 mg dose be split into a 40 mg and 10 mg dose.  Patient still has episodes of breakthrough anxiety and depression and when combined with alcohol or other depressants can result in patient having passive thoughts about now wanting to be alive, but no noted episodes in the last two months. Patient denied any plans or intent but has been counselled to ask for help with any friend, provider, or the ED were these negative thoughts to escalate in any way. Patient requested medication changes due to her improvement in mood and focus, including decreasing her Prozac, though she acknowledges the need for PRN medication for sleep multiple times a week. These medications can be done at this time, but patient was counselled about the importance of keeping a consistent dose of medication and using coping skills to minimize the need for PRN medication. No current signs of acute julisa, depression, or psychosis elicited.  Patient to follow up in one month. Patient remains at low acute risk of harm to self or others and appropriate for outpatient management, but patient's risk factors of low frustration tolerance and use of substances as coping skills indicate further monitoring of patients.  Plan: - Vyvanse 50 mg dose to be split into a 40 mg and 10 mg dose *BRAND NAME ONLY (generic is on back-order)  - Decrease Prozac to 40 mg PO qdaily, for OPAL / Panic Disorder - Hydroxyzine PO 50 mg once daily PRN as needed for anxiety/insomnia - f/u in 4 weeks - continue therapy with Moe Sanz

## 2024-10-03 NOTE — ASSESSMENT
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 Presenting for f/u for med management.   Extensive Hx and evaluation performed for ADHD dx. Pt sufficiently meets criteria for adult ADHD and was started on vyvanse; pt has never overused vyvanse or adderall, or other substances, though she acknowledges that vyvanse and adderall were illicitly obtained to self-medicate in the past, which patient repeated on 8/3. Patient is currently reporting that she does not believe that her current dose of Vyvanse does not last through the whole day and requested that her Vyvanse 50 mg dose be split into a 40 mg and 10 mg dose.  Patient still has episodes of breakthrough anxiety and depression and when combined with alcohol or other depressants can result in patient having passive thoughts about now wanting to be alive, but no noted episodes in the last two months. Patient denied any plans or intent but has been counselled to ask for help with any friend, provider, or the ED were these negative thoughts to escalate in any way. Patient requested medication changes due to her improvement in mood and focus, including decreasing her Prozac, though she acknowledges the need for PRN medication for sleep multiple times a week. These medications can be done at this time, but patient was counselled about the importance of keeping a consistent dose of medication and using coping skills to minimize the need for PRN medication. No current signs of acute julisa, depression, or psychosis elicited.  Patient to follow up in one month. Patient remains at low acute risk of harm to self or others and appropriate for outpatient management, but patient's risk factors of low frustration tolerance and use of substances as coping skills indicate further monitoring of patients.  Plan: - Vyvanse 50 mg dose to be split into a 40 mg and 10 mg dose *BRAND NAME ONLY (generic is on back-order)  - Decrease Prozac to 40 mg PO qdaily, for OPAL / Panic Disorder - Hydroxyzine PO 50 mg once daily PRN as needed for anxiety/insomnia - f/u in 4 weeks - continue therapy with Moe Sanz

## 2024-10-03 NOTE — PHYSICAL EXAM
[Average] : average [Cooperative] : cooperative [Euthymic] : euthymic [Full] : full [Loud] : loud [Overproductive] : overproductive [Linear/Goal Directed] : linear/goal directed [[place]] : concrete [Above average] : above average [WNL] : within normal limits [Mild] : mild

## 2024-10-03 NOTE — PHYSICAL EXAM
[Average] : average [Cooperative] : cooperative [Euthymic] : euthymic [Full] : full [Loud] : loud [Overproductive] : overproductive [Linear/Goal Directed] : linear/goal directed [[place]] : concrete [Above average] : above average [WNL] : within normal limits [Mild] : mild

## 2024-10-03 NOTE — REASON FOR VISIT
[Patient preference] : as per patient preference [Continuity of care] : to ensure continuity of care [Starting, patient seen in-person within last 6 months] : Telehealth services are being started as patient has seen in-person within last 6 months. [Telehealth (audio & video) - Individual/Group] : This visit was provided via telehealth using real-time 2-way audio visual technology. [Medical Office: ([facility])___] : The provider was located at the medical office in [unfilled]. [Home] : The patient, [unfilled], was located at home, [unfilled], at the time of the visit. [Verbal consent obtained from patient/other participant(s)] : Verbal consent for telehealth/telephonic services obtained from patient/other participant(s) [Patient] : Patient [Follow-Up Visit] : a follow-up visit [FreeTextEntry2] : 1/12/24 [FreeTextEntry1] : med management

## 2024-10-03 NOTE — HISTORY OF PRESENT ILLNESS
[FreeTextEntry1] : Pt seen via audio-visual communication.  Patient on presentation appeared calm polite and cooperative. 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 Patient denies any episodes of acute depression similar to the episodes she discussed at her last appointment. She reports fair focus but endorses that her ability to engage in classwork at the end of the day is limited so she asked if she could split her Vyvanse and take a portion of the dose later in the day, which she had done in the past to good effect. Patient reports fair sleep, averaging about 6 hours a night but with patient needing to take PRN Hydroxyzine either before sleep or when she walks up in the middle of the night, about 2-3 nights a week. Patient also requested to decrease her dose of Prozac. Patient was counselled about the importance of keeping a consistent dose of medication and using coping skills to minimize the need for PRN medication. No current signs of acute julisa, depression, or psychosis elicited. [FreeTextEntry3] : \par

## 2024-10-03 NOTE — RISK ASSESSMENT
[Yes] : Yes [Low acute suicide risk] : Low acute suicide risk [No] : No [No, patient denies ideation or behavior] : No, patient denies ideation or behavior [Not clinically indicated] : Safety Plan completed/updated (for individuals at risk): Not clinically indicated

## 2024-10-03 NOTE — REASON FOR VISIT
[Patient preference] : as per patient preference [Continuity of care] : to ensure continuity of care [Starting, patient seen in-person within last 6 months] : Telehealth services are being started as patient has seen in-person within last 6 months. [Telehealth (audio & video) - Individual/Group] : This visit was provided via telehealth using real-time 2-way audio visual technology. [Medical Office: ([facility])___] : The provider was located at the medical office in [unfilled]. [Home] : The patient, [unfilled], was located at home, [unfilled], at the time of the visit. [Verbal consent obtained from patient/other participant(s)] : Verbal consent for telehealth/telephonic services obtained from patient/other participant(s) [Patient] : Patient [Follow-Up Visit] : a follow-up visit [FreeTextEntry2] : 1/12/24 [FreeTextEntry1] : med management

## 2024-10-04 DIAGNOSIS — F90.9 ATTENTION-DEFICIT HYPERACTIVITY DISORDER, UNSPECIFIED TYPE: ICD-10-CM

## 2024-10-04 DIAGNOSIS — F41.1 GENERALIZED ANXIETY DISORDER: ICD-10-CM

## 2024-10-04 RX ORDER — FLUOXETINE HYDROCHLORIDE 40 MG/1
40 CAPSULE ORAL
Qty: 30 | Refills: 0 | Status: ACTIVE | COMMUNITY
Start: 2024-10-04 | End: 1900-01-01

## 2024-10-04 RX ORDER — LISDEXAMFETAMINE DIMESYLATE 10 MG/1
10 CAPSULE ORAL DAILY
Qty: 30 | Refills: 0 | Status: ACTIVE | COMMUNITY
Start: 2024-10-03 | End: 1900-01-01

## 2024-10-28 ENCOUNTER — NON-APPOINTMENT (OUTPATIENT)
Age: 34
End: 2024-10-28

## 2024-11-01 ENCOUNTER — APPOINTMENT (OUTPATIENT)
Dept: PSYCHIATRY | Facility: CLINIC | Age: 34
End: 2024-11-01
Payer: COMMERCIAL

## 2024-11-01 ENCOUNTER — OUTPATIENT (OUTPATIENT)
Dept: OUTPATIENT SERVICES | Facility: HOSPITAL | Age: 34
LOS: 1 days | End: 2024-11-01
Payer: COMMERCIAL

## 2024-11-01 DIAGNOSIS — F41.1 GENERALIZED ANXIETY DISORDER: ICD-10-CM

## 2024-11-01 DIAGNOSIS — F90.9 ATTENTION-DEFICIT HYPERACTIVITY DISORDER, UNSPECIFIED TYPE: ICD-10-CM

## 2024-11-01 PROCEDURE — ZZZZZ: CPT

## 2024-11-01 PROCEDURE — 99214 OFFICE O/P EST MOD 30 MIN: CPT | Mod: 95

## 2024-11-02 DIAGNOSIS — F41.1 GENERALIZED ANXIETY DISORDER: ICD-10-CM

## 2024-11-22 ENCOUNTER — APPOINTMENT (OUTPATIENT)
Dept: PSYCHIATRY | Facility: CLINIC | Age: 34
End: 2024-11-22

## 2024-12-06 ENCOUNTER — APPOINTMENT (OUTPATIENT)
Dept: PSYCHIATRY | Facility: CLINIC | Age: 34
End: 2024-12-06
Payer: COMMERCIAL

## 2024-12-06 ENCOUNTER — OUTPATIENT (OUTPATIENT)
Dept: OUTPATIENT SERVICES | Facility: HOSPITAL | Age: 34
LOS: 1 days | End: 2024-12-06
Payer: COMMERCIAL

## 2024-12-06 DIAGNOSIS — F90.9 ATTENTION-DEFICIT HYPERACTIVITY DISORDER, UNSPECIFIED TYPE: ICD-10-CM

## 2024-12-06 DIAGNOSIS — F41.1 GENERALIZED ANXIETY DISORDER: ICD-10-CM

## 2024-12-06 DIAGNOSIS — F41.9 ANXIETY DISORDER, UNSPECIFIED: ICD-10-CM

## 2024-12-06 DIAGNOSIS — G47.00 INSOMNIA, UNSPECIFIED: ICD-10-CM

## 2024-12-06 PROCEDURE — ZZZZZ: CPT

## 2024-12-06 PROCEDURE — 99214 OFFICE O/P EST MOD 30 MIN: CPT | Mod: 95

## 2024-12-06 RX ORDER — CLONIDINE HYDROCHLORIDE 0.1 MG/1
0.1 TABLET ORAL
Qty: 30 | Refills: 1 | Status: ACTIVE | COMMUNITY
Start: 2024-12-06 | End: 1900-01-01

## 2024-12-07 DIAGNOSIS — F41.1 GENERALIZED ANXIETY DISORDER: ICD-10-CM

## 2024-12-07 DIAGNOSIS — G47.00 INSOMNIA, UNSPECIFIED: ICD-10-CM

## 2024-12-07 DIAGNOSIS — F90.9 ATTENTION-DEFICIT HYPERACTIVITY DISORDER, UNSPECIFIED TYPE: ICD-10-CM

## 2025-01-03 ENCOUNTER — APPOINTMENT (OUTPATIENT)
Dept: PSYCHIATRY | Facility: CLINIC | Age: 35
End: 2025-01-03

## 2025-01-03 ENCOUNTER — OUTPATIENT (OUTPATIENT)
Dept: OUTPATIENT SERVICES | Facility: HOSPITAL | Age: 35
LOS: 1 days | End: 2025-01-03
Payer: COMMERCIAL

## 2025-01-03 DIAGNOSIS — F90.9 ATTENTION-DEFICIT HYPERACTIVITY DISORDER, UNSPECIFIED TYPE: ICD-10-CM

## 2025-01-03 DIAGNOSIS — F41.1 GENERALIZED ANXIETY DISORDER: ICD-10-CM

## 2025-01-03 PROCEDURE — 98007 SYNCH AUDIO-VIDEO EST HI 40: CPT

## 2025-01-03 PROCEDURE — ZZZZZ: CPT

## 2025-01-04 DIAGNOSIS — F41.1 GENERALIZED ANXIETY DISORDER: ICD-10-CM

## 2025-01-04 DIAGNOSIS — F90.9 ATTENTION-DEFICIT HYPERACTIVITY DISORDER, UNSPECIFIED TYPE: ICD-10-CM

## 2025-02-11 ENCOUNTER — APPOINTMENT (OUTPATIENT)
Dept: PSYCHIATRY | Facility: CLINIC | Age: 35
End: 2025-02-11

## 2025-02-11 ENCOUNTER — NON-APPOINTMENT (OUTPATIENT)
Age: 35
End: 2025-02-11

## 2025-02-25 ENCOUNTER — APPOINTMENT (OUTPATIENT)
Dept: PSYCHIATRY | Facility: CLINIC | Age: 35
End: 2025-02-25
Payer: COMMERCIAL

## 2025-02-25 ENCOUNTER — OUTPATIENT (OUTPATIENT)
Dept: OUTPATIENT SERVICES | Facility: HOSPITAL | Age: 35
LOS: 1 days | End: 2025-02-25
Payer: COMMERCIAL

## 2025-02-25 DIAGNOSIS — F90.9 ATTENTION-DEFICIT HYPERACTIVITY DISORDER, UNSPECIFIED TYPE: ICD-10-CM

## 2025-02-25 DIAGNOSIS — F41.1 GENERALIZED ANXIETY DISORDER: ICD-10-CM

## 2025-02-25 DIAGNOSIS — F41.0 PANIC DISORDER [EPISODIC PAROXYSMAL ANXIETY]: ICD-10-CM

## 2025-02-25 PROCEDURE — 98007 SYNCH AUDIO-VIDEO EST HI 40: CPT

## 2025-02-25 PROCEDURE — ZZZZZ: CPT

## 2025-02-26 DIAGNOSIS — F41.1 GENERALIZED ANXIETY DISORDER: ICD-10-CM

## 2025-03-28 ENCOUNTER — APPOINTMENT (OUTPATIENT)
Dept: PSYCHIATRY | Facility: CLINIC | Age: 35
End: 2025-03-28

## 2025-04-03 ENCOUNTER — APPOINTMENT (OUTPATIENT)
Dept: PSYCHIATRY | Facility: CLINIC | Age: 35
End: 2025-04-03

## 2025-04-08 ENCOUNTER — APPOINTMENT (OUTPATIENT)
Dept: PSYCHIATRY | Facility: CLINIC | Age: 35
End: 2025-04-08
Payer: COMMERCIAL

## 2025-04-08 ENCOUNTER — OUTPATIENT (OUTPATIENT)
Dept: OUTPATIENT SERVICES | Facility: HOSPITAL | Age: 35
LOS: 1 days | End: 2025-04-08
Payer: COMMERCIAL

## 2025-04-08 DIAGNOSIS — F41.1 GENERALIZED ANXIETY DISORDER: ICD-10-CM

## 2025-04-08 DIAGNOSIS — F90.9 ATTENTION-DEFICIT HYPERACTIVITY DISORDER, UNSPECIFIED TYPE: ICD-10-CM

## 2025-04-08 DIAGNOSIS — F41.0 PANIC DISORDER [EPISODIC PAROXYSMAL ANXIETY]: ICD-10-CM

## 2025-04-08 PROCEDURE — 98006 SYNCH AUDIO-VIDEO EST MOD 30: CPT

## 2025-04-08 PROCEDURE — ZZZZZ: CPT

## 2025-04-09 DIAGNOSIS — F41.1 GENERALIZED ANXIETY DISORDER: ICD-10-CM

## 2025-04-25 ENCOUNTER — APPOINTMENT (OUTPATIENT)
Dept: PSYCHIATRY | Facility: CLINIC | Age: 35
End: 2025-04-25

## 2025-04-25 ENCOUNTER — NON-APPOINTMENT (OUTPATIENT)
Age: 35
End: 2025-04-25

## 2025-05-08 ENCOUNTER — OUTPATIENT (OUTPATIENT)
Dept: OUTPATIENT SERVICES | Facility: HOSPITAL | Age: 35
LOS: 1 days | End: 2025-05-08
Payer: COMMERCIAL

## 2025-05-08 ENCOUNTER — APPOINTMENT (OUTPATIENT)
Dept: PSYCHIATRY | Facility: CLINIC | Age: 35
End: 2025-05-08
Payer: COMMERCIAL

## 2025-05-08 DIAGNOSIS — F41.0 PANIC DISORDER [EPISODIC PAROXYSMAL ANXIETY]: ICD-10-CM

## 2025-05-08 DIAGNOSIS — F90.9 ATTENTION-DEFICIT HYPERACTIVITY DISORDER, UNSPECIFIED TYPE: ICD-10-CM

## 2025-05-08 DIAGNOSIS — F41.1 GENERALIZED ANXIETY DISORDER: ICD-10-CM

## 2025-05-08 PROCEDURE — ZZZZZ: CPT

## 2025-05-08 PROCEDURE — 98007 SYNCH AUDIO-VIDEO EST HI 40: CPT

## 2025-05-09 DIAGNOSIS — F41.1 GENERALIZED ANXIETY DISORDER: ICD-10-CM

## 2025-05-09 DIAGNOSIS — F41.0 PANIC DISORDER [EPISODIC PAROXYSMAL ANXIETY]: ICD-10-CM

## 2025-06-13 ENCOUNTER — APPOINTMENT (OUTPATIENT)
Dept: PSYCHIATRY | Facility: CLINIC | Age: 35
End: 2025-06-13

## 2025-06-17 ENCOUNTER — OUTPATIENT (OUTPATIENT)
Dept: OUTPATIENT SERVICES | Facility: HOSPITAL | Age: 35
LOS: 1 days | End: 2025-06-17
Payer: COMMERCIAL

## 2025-06-17 ENCOUNTER — APPOINTMENT (OUTPATIENT)
Dept: PSYCHIATRY | Facility: CLINIC | Age: 35
End: 2025-06-17

## 2025-06-17 DIAGNOSIS — F90.9 ATTENTION-DEFICIT HYPERACTIVITY DISORDER, UNSPECIFIED TYPE: ICD-10-CM

## 2025-06-17 DIAGNOSIS — F41.1 GENERALIZED ANXIETY DISORDER: ICD-10-CM

## 2025-06-17 PROCEDURE — ZZZZZ: CPT

## 2025-06-17 PROCEDURE — 98007 SYNCH AUDIO-VIDEO EST HI 40: CPT

## 2025-06-18 DIAGNOSIS — F41.1 GENERALIZED ANXIETY DISORDER: ICD-10-CM

## 2025-06-18 DIAGNOSIS — F90.9 ATTENTION-DEFICIT HYPERACTIVITY DISORDER, UNSPECIFIED TYPE: ICD-10-CM

## 2025-07-15 ENCOUNTER — APPOINTMENT (OUTPATIENT)
Dept: PSYCHIATRY | Facility: CLINIC | Age: 35
End: 2025-07-15

## 2025-07-15 ENCOUNTER — OUTPATIENT (OUTPATIENT)
Dept: OUTPATIENT SERVICES | Facility: HOSPITAL | Age: 35
LOS: 1 days | End: 2025-07-15
Payer: COMMERCIAL

## 2025-07-15 DIAGNOSIS — F41.1 GENERALIZED ANXIETY DISORDER: ICD-10-CM

## 2025-07-15 DIAGNOSIS — F90.9 ATTENTION-DEFICIT HYPERACTIVITY DISORDER, UNSPECIFIED TYPE: ICD-10-CM

## 2025-07-15 PROCEDURE — 98007 SYNCH AUDIO-VIDEO EST HI 40: CPT

## 2025-07-15 PROCEDURE — ZZZZZ: CPT

## 2025-07-15 RX ORDER — LISDEXAMFETAMINE DIMESYLATE 10 MG/1
10 CAPSULE ORAL DAILY
Qty: 21 | Refills: 0 | Status: ACTIVE | COMMUNITY
Start: 2025-07-15 | End: 1900-01-01

## 2025-07-16 DIAGNOSIS — F90.9 ATTENTION-DEFICIT HYPERACTIVITY DISORDER, UNSPECIFIED TYPE: ICD-10-CM

## 2025-07-16 DIAGNOSIS — F41.1 GENERALIZED ANXIETY DISORDER: ICD-10-CM

## 2025-08-01 ENCOUNTER — APPOINTMENT (OUTPATIENT)
Dept: PSYCHIATRY | Facility: CLINIC | Age: 35
End: 2025-08-01

## 2025-09-16 ENCOUNTER — APPOINTMENT (OUTPATIENT)
Dept: PSYCHIATRY | Facility: CLINIC | Age: 35
End: 2025-09-16

## 2025-09-16 DIAGNOSIS — F90.9 ATTENTION-DEFICIT HYPERACTIVITY DISORDER, UNSPECIFIED TYPE: ICD-10-CM

## 2025-09-16 DIAGNOSIS — F41.1 GENERALIZED ANXIETY DISORDER: ICD-10-CM
